# Patient Record
Sex: MALE | Race: BLACK OR AFRICAN AMERICAN | NOT HISPANIC OR LATINO | Employment: FULL TIME | URBAN - METROPOLITAN AREA
[De-identification: names, ages, dates, MRNs, and addresses within clinical notes are randomized per-mention and may not be internally consistent; named-entity substitution may affect disease eponyms.]

---

## 2021-01-31 ENCOUNTER — OFFICE VISIT (OUTPATIENT)
Dept: URGENT CARE | Facility: CLINIC | Age: 41
End: 2021-01-31

## 2021-01-31 VITALS
RESPIRATION RATE: 16 BRPM | HEIGHT: 74 IN | SYSTOLIC BLOOD PRESSURE: 153 MMHG | HEART RATE: 99 BPM | BODY MASS INDEX: 24.51 KG/M2 | WEIGHT: 191 LBS | OXYGEN SATURATION: 96 % | TEMPERATURE: 98 F | DIASTOLIC BLOOD PRESSURE: 105 MMHG

## 2021-01-31 DIAGNOSIS — U07.1 COVID-19 VIRUS INFECTION: Primary | ICD-10-CM

## 2021-01-31 DIAGNOSIS — U07.1 COVID-19 VIRUS DETECTED: ICD-10-CM

## 2021-01-31 DIAGNOSIS — Z11.59 ENCOUNTER FOR SCREENING FOR OTHER VIRAL DISEASES: ICD-10-CM

## 2021-01-31 LAB
CTP QC/QA: YES
SARS-COV-2 RDRP RESP QL NAA+PROBE: POSITIVE

## 2021-01-31 PROCEDURE — 99203 OFFICE O/P NEW LOW 30 MIN: CPT | Mod: S$GLB,,, | Performed by: NURSE PRACTITIONER

## 2021-01-31 PROCEDURE — 99203 PR OFFICE/OUTPT VISIT, NEW, LEVL III, 30-44 MIN: ICD-10-PCS | Mod: S$GLB,,, | Performed by: NURSE PRACTITIONER

## 2021-01-31 PROCEDURE — U0002: ICD-10-PCS | Mod: QW,S$GLB,, | Performed by: NURSE PRACTITIONER

## 2021-01-31 PROCEDURE — U0002 COVID-19 LAB TEST NON-CDC: HCPCS | Mod: QW,S$GLB,, | Performed by: NURSE PRACTITIONER

## 2021-02-16 ENCOUNTER — OFFICE VISIT (OUTPATIENT)
Dept: URGENT CARE | Facility: CLINIC | Age: 41
End: 2021-02-16

## 2021-04-12 ENCOUNTER — OFFICE VISIT (OUTPATIENT)
Dept: URGENT CARE | Facility: CLINIC | Age: 41
End: 2021-04-12

## 2021-04-12 VITALS
RESPIRATION RATE: 17 BRPM | DIASTOLIC BLOOD PRESSURE: 96 MMHG | WEIGHT: 193 LBS | TEMPERATURE: 97 F | OXYGEN SATURATION: 97 % | HEART RATE: 63 BPM | HEIGHT: 74 IN | BODY MASS INDEX: 24.77 KG/M2 | SYSTOLIC BLOOD PRESSURE: 154 MMHG

## 2021-04-12 DIAGNOSIS — L02.31 LEFT BUTTOCK ABSCESS: Primary | ICD-10-CM

## 2021-04-12 PROCEDURE — 99204 PR OFFICE/OUTPT VISIT, NEW, LEVL IV, 45-59 MIN: ICD-10-PCS | Mod: TIER,25,S$GLB, | Performed by: NURSE PRACTITIONER

## 2021-04-12 PROCEDURE — 10060 INCISION & DRAINAGE: ICD-10-PCS | Mod: TIER,S$GLB,, | Performed by: NURSE PRACTITIONER

## 2021-04-12 PROCEDURE — 99204 OFFICE O/P NEW MOD 45 MIN: CPT | Mod: TIER,25,S$GLB, | Performed by: NURSE PRACTITIONER

## 2021-04-12 PROCEDURE — 10060 I&D ABSCESS SIMPLE/SINGLE: CPT | Mod: TIER,S$GLB,, | Performed by: NURSE PRACTITIONER

## 2021-04-12 RX ORDER — HYDROCODONE BITARTRATE AND ACETAMINOPHEN 5; 325 MG/1; MG/1
1 TABLET ORAL EVERY 6 HOURS PRN
Qty: 10 TABLET | Refills: 0 | Status: SHIPPED | OUTPATIENT
Start: 2021-04-12

## 2021-04-12 RX ORDER — MUPIROCIN 20 MG/G
OINTMENT TOPICAL
Qty: 22 G | Refills: 1 | Status: SHIPPED | OUTPATIENT
Start: 2021-04-12

## 2021-04-12 RX ORDER — SULFAMETHOXAZOLE AND TRIMETHOPRIM 800; 160 MG/1; MG/1
1 TABLET ORAL 2 TIMES DAILY
Qty: 20 TABLET | Refills: 0 | Status: SHIPPED | OUTPATIENT
Start: 2021-04-12 | End: 2021-04-22

## 2021-07-17 ENCOUNTER — OFFICE VISIT (OUTPATIENT)
Dept: URGENT CARE | Facility: CLINIC | Age: 41
End: 2021-07-17

## 2021-07-17 VITALS
OXYGEN SATURATION: 97 % | RESPIRATION RATE: 18 BRPM | TEMPERATURE: 98 F | DIASTOLIC BLOOD PRESSURE: 97 MMHG | BODY MASS INDEX: 24.77 KG/M2 | HEIGHT: 74 IN | HEART RATE: 85 BPM | WEIGHT: 193 LBS | SYSTOLIC BLOOD PRESSURE: 146 MMHG

## 2021-07-17 DIAGNOSIS — J06.9 UPPER RESPIRATORY TRACT INFECTION, UNSPECIFIED TYPE: ICD-10-CM

## 2021-07-17 DIAGNOSIS — R50.9 FEVER, UNSPECIFIED FEVER CAUSE: Primary | ICD-10-CM

## 2021-07-17 LAB
CTP QC/QA: YES
SARS-COV-2 RDRP RESP QL NAA+PROBE: NEGATIVE

## 2021-07-17 PROCEDURE — U0002 COVID-19 LAB TEST NON-CDC: HCPCS | Mod: QW,TIER,S$GLB, | Performed by: EMERGENCY MEDICINE

## 2021-07-17 PROCEDURE — U0002: ICD-10-PCS | Mod: QW,TIER,S$GLB, | Performed by: EMERGENCY MEDICINE

## 2021-07-17 PROCEDURE — 99203 PR OFFICE/OUTPT VISIT, NEW, LEVL III, 30-44 MIN: ICD-10-PCS | Mod: TIER,S$GLB,, | Performed by: EMERGENCY MEDICINE

## 2021-07-17 PROCEDURE — 99203 OFFICE O/P NEW LOW 30 MIN: CPT | Mod: TIER,S$GLB,, | Performed by: EMERGENCY MEDICINE

## 2021-11-10 ENCOUNTER — OFFICE VISIT (OUTPATIENT)
Dept: URGENT CARE | Facility: CLINIC | Age: 41
End: 2021-11-10

## 2021-11-10 VITALS
RESPIRATION RATE: 16 BRPM | HEART RATE: 74 BPM | HEIGHT: 74 IN | DIASTOLIC BLOOD PRESSURE: 106 MMHG | OXYGEN SATURATION: 98 % | BODY MASS INDEX: 25.41 KG/M2 | TEMPERATURE: 99 F | WEIGHT: 198 LBS | SYSTOLIC BLOOD PRESSURE: 159 MMHG

## 2021-11-10 DIAGNOSIS — Z11.59 ENCOUNTER FOR SCREENING FOR OTHER VIRAL DISEASES: Primary | ICD-10-CM

## 2021-11-10 LAB
CTP QC/QA: YES
SARS-COV-2 RDRP RESP QL NAA+PROBE: NEGATIVE

## 2021-11-10 PROCEDURE — U0002: ICD-10-PCS | Mod: QW,TIER,S$GLB,

## 2021-11-10 PROCEDURE — U0002 COVID-19 LAB TEST NON-CDC: HCPCS | Mod: QW,TIER,S$GLB,

## 2021-11-10 PROCEDURE — 99203 OFFICE O/P NEW LOW 30 MIN: CPT | Mod: TIER,S$GLB,,

## 2021-11-10 PROCEDURE — 99203 PR OFFICE/OUTPT VISIT, NEW, LEVL III, 30-44 MIN: ICD-10-PCS | Mod: TIER,S$GLB,,

## 2022-04-02 ENCOUNTER — OFFICE VISIT (OUTPATIENT)
Dept: URGENT CARE | Facility: CLINIC | Age: 42
End: 2022-04-02

## 2022-04-02 VITALS
DIASTOLIC BLOOD PRESSURE: 121 MMHG | WEIGHT: 198 LBS | HEIGHT: 74 IN | TEMPERATURE: 98 F | BODY MASS INDEX: 25.41 KG/M2 | RESPIRATION RATE: 18 BRPM | OXYGEN SATURATION: 98 % | SYSTOLIC BLOOD PRESSURE: 156 MMHG | HEART RATE: 68 BPM

## 2022-04-02 DIAGNOSIS — L03.317 CELLULITIS AND ABSCESS OF BUTTOCK: Primary | ICD-10-CM

## 2022-04-02 DIAGNOSIS — L02.31 CELLULITIS AND ABSCESS OF BUTTOCK: Primary | ICD-10-CM

## 2022-04-02 PROCEDURE — 10060 INCISION & DRAINAGE: ICD-10-PCS | Mod: TIER,S$GLB,, | Performed by: NURSE PRACTITIONER

## 2022-04-02 PROCEDURE — 99204 OFFICE O/P NEW MOD 45 MIN: CPT | Mod: TIER,25,S$GLB, | Performed by: NURSE PRACTITIONER

## 2022-04-02 PROCEDURE — 99204 PR OFFICE/OUTPT VISIT, NEW, LEVL IV, 45-59 MIN: ICD-10-PCS | Mod: TIER,25,S$GLB, | Performed by: NURSE PRACTITIONER

## 2022-04-02 PROCEDURE — 10060 I&D ABSCESS SIMPLE/SINGLE: CPT | Mod: TIER,S$GLB,, | Performed by: NURSE PRACTITIONER

## 2022-04-02 RX ORDER — IBUPROFEN 600 MG/1
600 TABLET ORAL 3 TIMES DAILY PRN
Qty: 30 TABLET | Refills: 0 | Status: SHIPPED | OUTPATIENT
Start: 2022-04-02

## 2022-04-02 RX ORDER — SULFAMETHOXAZOLE AND TRIMETHOPRIM 800; 160 MG/1; MG/1
1 TABLET ORAL 2 TIMES DAILY
Qty: 14 TABLET | Refills: 0 | Status: SHIPPED | OUTPATIENT
Start: 2022-04-02 | End: 2022-04-09

## 2022-04-02 RX ORDER — MUPIROCIN 20 MG/G
OINTMENT TOPICAL 3 TIMES DAILY
Qty: 1 EACH | Refills: 0 | Status: SHIPPED | OUTPATIENT
Start: 2022-04-02 | End: 2022-04-07

## 2022-04-02 NOTE — PROCEDURES
"Incision & Drainage    Date/Time: 4/2/2022 12:15 PM  Performed by: Yobany Wolf NP  Authorized by: Yobany Wolf NP     Time out: Immediately prior to procedure a "time out" was called to verify the correct patient, procedure, equipment, support staff and site/side marked as required.    Consent Done?:  Yes (Verbal)    Type:  Abscess  Body area:  Anogenital (left buttocks)  Local anesthetic: lidocaine 1% without epinephrine  Anesthetic total (ml):  1  Scalpel size:  11  Incision type:  Single straight  Incision depth: dermal    Complexity:  Simple  Drainage:  Pus, purulent and bloody  Drainage amount:  Moderate  Wound treatment:  Incision, drainage and wound left open  Packing material:  None  Patient tolerance:  Patient tolerated the procedure well with no immediate complications      "

## 2022-04-02 NOTE — PROGRESS NOTES
"Subjective:       Patient ID: Eben Lagunas is a 41 y.o. male.    Vitals:  height is 6' 2" (1.88 m) and weight is 89.8 kg (198 lb). His temperature is 98.3 °F (36.8 °C). His blood pressure is 156/121 (abnormal) and his pulse is 68. His respiration is 18 and oxygen saturation is 98%.     Chief Complaint: Insect Bite    41 y.o. male presents today with what he suspects to be a spider bite. Pt states he isn't sure if it is an abscess or insect bite.     Provider note begins below:  41-year-old male presents today with complaints of a possible abscess to his left buttocks area for the past 3-4 days.  Patient complains of pain to the area.  Denies fever, chills, or drainage from the area.  Patient reports a history of abscesses on his buttocks in the past.  Patient tried no remedies.  Denies actually seeing a spider bite him.    Insect Bite  This is a new problem. The current episode started in the past 7 days (Monday ). The problem occurs constantly. Associated symptoms include a rash and a visual change. Pertinent negatives include no abdominal pain, anorexia, arthralgias, change in bowel habit, chest pain, chills, congestion, coughing, diaphoresis, fatigue, fever, headaches, joint swelling, myalgias, nausea, neck pain, numbness, sore throat, swollen glands, urinary symptoms, vertigo, vomiting or weakness. Associated symptoms comments: stinging. The symptoms are aggravated by standing and twisting. He has tried NSAIDs for the symptoms. The treatment provided mild relief.       Constitution: Negative. Negative for chills, sweating, fatigue and fever.   HENT: Negative for ear pain, ear discharge, facial swelling, congestion, sinus pressure and sore throat.    Neck: Negative for neck pain, neck stiffness and painful lymph nodes.   Cardiovascular: Negative.  Negative for chest pain and sob on exertion.   Eyes: Negative.  Negative for eye itching, eye pain and eye redness.   Respiratory: Negative.  Negative for " chest tightness, cough, shortness of breath, wheezing and asthma.    Gastrointestinal: Negative.  Negative for abdominal pain, nausea and vomiting.   Endocrine: negative. excessive thirst.   Genitourinary: Negative.  Negative for dysuria, frequency, urgency and flank pain.   Musculoskeletal: Negative.  Negative for pain, trauma, joint pain, joint swelling and muscle ache.   Skin: Positive for rash. Negative for wound, lesion, erythema and hives.   Allergic/Immunologic: Negative.  Negative for eczema, asthma, hives, itching and sneezing.   Neurological: Negative.  Negative for dizziness, history of vertigo, passing out, headaches, disorientation, altered mental status and numbness.   Hematologic/Lymphatic: Negative.  Negative for swollen lymph nodes.   Psychiatric/Behavioral: Negative.  Negative for altered mental status, disorientation and confusion.       Objective:      Physical Exam   Constitutional: He is oriented to person, place, and time. He appears well-developed.   HENT:   Head: Normocephalic and atraumatic. Head is without abrasion, without contusion and without laceration.   Ears:   Right Ear: External ear normal.   Left Ear: External ear normal.   Nose: Nose normal.   Mouth/Throat: Oropharynx is clear and moist and mucous membranes are normal.   Eyes: Conjunctivae, EOM and lids are normal. Pupils are equal, round, and reactive to light.   Neck: Trachea normal and phonation normal. Neck supple.   Cardiovascular: Normal rate, regular rhythm and normal heart sounds.   Pulmonary/Chest: Effort normal and breath sounds normal. No stridor. No respiratory distress.   Musculoskeletal: Normal range of motion.         General: Normal range of motion.   Neurological: He is alert and oriented to person, place, and time.   Skin: Skin is warm, dry, intact and no rash. Capillary refill takes less than 2 seconds. No abrasion, No burn, No bruising, No erythema and No ecchymosis         Comments: 1 cm abscess located to  left outer buttocks area with induration and fluctuation with pus pocket.   Psychiatric: His speech is normal and behavior is normal. Judgment and thought content normal.   Nursing note and vitals reviewed.            Assessment:       1. Cellulitis and abscess of buttock          Plan:       FOLLOWUP  Follow up if symptoms worsen or fail to improve, for PLEASE CONTACT PCP OR CONTACT THE EMERGENCY ROOM..     PATIENT INSTRUCTIONS  Patient Instructions   INSTRUCTIONS:  - Rest.  - Drink plenty of fluids.  - Take Tylenol and/or Ibuprofen as directed as needed for fever/pain.  Do not take more than the recommended dose.  - follow up with your PCP within the next 1-2 weeks as needed.  - You must understand that you have received an Urgent Care treatment only and that you may be released before all of your medical problems are known or treated.   - You, the patient, will arrange for follow up care as instructed.   - If your condition worsens or fails to improve we recommend that you receive another evaluation at the ER immediately or contact your PCP to discuss your concerns.   - You can call [phone] or [phone] to help schedule an appointment with the appropriate provider.              THANK YOU FOR ALLOWING ME TO PARTICIPATE IN YOUR HEALTHCARE,     Yobany Wolf, NP   Cellulitis and abscess of buttock  -     sulfamethoxazole-trimethoprim 800-160mg (BACTRIM DS) 800-160 mg Tab; Take 1 tablet by mouth 2 (two) times daily. for 7 days  Dispense: 14 tablet; Refill: 0  -     mupirocin (BACTROBAN) 2 % ointment; Apply topically 3 (three) times daily. for 5 days  Dispense: 1 each; Refill: 0  -     ibuprofen (ADVIL,MOTRIN) 600 MG tablet; Take 1 tablet (600 mg total) by mouth 3 (three) times daily as needed for Pain.  Dispense: 30 tablet; Refill: 0  -     Incision & Drainage

## 2022-04-02 NOTE — PATIENT INSTRUCTIONS
INSTRUCTIONS:  - Rest.  - Drink plenty of fluids.  - Take Tylenol and/or Ibuprofen as directed as needed for fever/pain.  Do not take more than the recommended dose.  - follow up with your PCP within the next 1-2 weeks as needed.  - You must understand that you have received an Urgent Care treatment only and that you may be released before all of your medical problems are known or treated.   - You, the patient, will arrange for follow up care as instructed.   - If your condition worsens or fails to improve we recommend that you receive another evaluation at the ER immediately or contact your PCP to discuss your concerns.   - You can call [phone] or [phone] to help schedule an appointment with the appropriate provider.

## 2023-07-30 ENCOUNTER — HOSPITAL ENCOUNTER (EMERGENCY)
Facility: HOSPITAL | Age: 43
Discharge: HOME | End: 2023-07-30
Attending: EMERGENCY MEDICINE

## 2023-07-30 VITALS
OXYGEN SATURATION: 98 % | DIASTOLIC BLOOD PRESSURE: 111 MMHG | RESPIRATION RATE: 18 BRPM | SYSTOLIC BLOOD PRESSURE: 164 MMHG | TEMPERATURE: 99 F | HEART RATE: 69 BPM

## 2023-07-30 DIAGNOSIS — M54.6 CHRONIC BILATERAL THORACIC BACK PAIN: Primary | ICD-10-CM

## 2023-07-30 DIAGNOSIS — G89.29 CHRONIC BILATERAL THORACIC BACK PAIN: Primary | ICD-10-CM

## 2023-07-30 PROCEDURE — 96372 THER/PROPH/DIAG INJ SC/IM: CPT | Performed by: PHYSICIAN ASSISTANT

## 2023-07-30 PROCEDURE — 99284 EMERGENCY DEPT VISIT MOD MDM: CPT

## 2023-07-30 PROCEDURE — 63600175 PHARM REV CODE 636 W HCPCS: Performed by: PHYSICIAN ASSISTANT

## 2023-07-30 RX ORDER — DEXAMETHASONE SODIUM PHOSPHATE 4 MG/ML
8 INJECTION, SOLUTION INTRA-ARTICULAR; INTRALESIONAL; INTRAMUSCULAR; INTRAVENOUS; SOFT TISSUE
Status: COMPLETED | OUTPATIENT
Start: 2023-07-30 | End: 2023-07-30

## 2023-07-30 RX ORDER — METHOCARBAMOL 750 MG/1
1500 TABLET, FILM COATED ORAL EVERY 8 HOURS PRN
Qty: 12 TABLET | Refills: 0 | Status: SHIPPED | OUTPATIENT
Start: 2023-07-30 | End: 2023-07-31

## 2023-07-30 RX ORDER — MELOXICAM 7.5 MG/1
7.5 TABLET ORAL DAILY
Qty: 30 TABLET | Refills: 0 | Status: SHIPPED | OUTPATIENT
Start: 2023-07-30 | End: 2023-07-31

## 2023-07-30 RX ADMIN — DEXAMETHASONE SODIUM PHOSPHATE 8 MG: 4 INJECTION INTRA-ARTICULAR; INTRALESIONAL; INTRAMUSCULAR; INTRAVENOUS; SOFT TISSUE at 08:07

## 2023-07-30 NOTE — Clinical Note
"Eben"Adria Lagunas was seen and treated in our emergency department on 7/30/2023.  He may return to work on 07/31/2023.       If you have any questions or concerns, please don't hesitate to call.      Abida Kendall PA-C"

## 2023-07-31 ENCOUNTER — OFFICE VISIT (OUTPATIENT)
Dept: URGENT CARE | Facility: CLINIC | Age: 43
End: 2023-07-31

## 2023-07-31 VITALS
TEMPERATURE: 98 F | WEIGHT: 190 LBS | RESPIRATION RATE: 16 BRPM | HEIGHT: 74 IN | SYSTOLIC BLOOD PRESSURE: 140 MMHG | HEART RATE: 87 BPM | DIASTOLIC BLOOD PRESSURE: 100 MMHG | OXYGEN SATURATION: 99 % | BODY MASS INDEX: 24.38 KG/M2

## 2023-07-31 DIAGNOSIS — M54.9 BILATERAL BACK PAIN, UNSPECIFIED BACK LOCATION, UNSPECIFIED CHRONICITY: ICD-10-CM

## 2023-07-31 DIAGNOSIS — S39.012A BACK STRAIN, INITIAL ENCOUNTER: ICD-10-CM

## 2023-07-31 DIAGNOSIS — I10 PRIMARY HYPERTENSION: Primary | ICD-10-CM

## 2023-07-31 PROCEDURE — 72070 XR THORACIC SPINE AP LATERAL: ICD-10-PCS | Mod: TIER,S$GLB,, | Performed by: RADIOLOGY

## 2023-07-31 PROCEDURE — 72100 X-RAY EXAM L-S SPINE 2/3 VWS: CPT | Mod: TIER,S$GLB,, | Performed by: RADIOLOGY

## 2023-07-31 PROCEDURE — 99204 OFFICE O/P NEW MOD 45 MIN: CPT | Mod: TIER,25,S$GLB, | Performed by: FAMILY MEDICINE

## 2023-07-31 PROCEDURE — 96372 PR INJECTION,THERAP/PROPH/DIAG2ST, IM OR SUBCUT: ICD-10-PCS | Mod: TIER,S$GLB,, | Performed by: FAMILY MEDICINE

## 2023-07-31 PROCEDURE — 72100 XR LUMBAR SPINE AP AND LATERAL: ICD-10-PCS | Mod: TIER,S$GLB,, | Performed by: RADIOLOGY

## 2023-07-31 PROCEDURE — 72070 X-RAY EXAM THORAC SPINE 2VWS: CPT | Mod: TIER,S$GLB,, | Performed by: RADIOLOGY

## 2023-07-31 PROCEDURE — 99204 PR OFFICE/OUTPT VISIT, NEW, LEVL IV, 45-59 MIN: ICD-10-PCS | Mod: TIER,25,S$GLB, | Performed by: FAMILY MEDICINE

## 2023-07-31 PROCEDURE — 96372 THER/PROPH/DIAG INJ SC/IM: CPT | Mod: TIER,S$GLB,, | Performed by: FAMILY MEDICINE

## 2023-07-31 RX ORDER — AMLODIPINE BESYLATE 2.5 MG/1
2.5 TABLET ORAL DAILY
Qty: 60 TABLET | Refills: 0 | Status: SHIPPED | OUTPATIENT
Start: 2023-07-31 | End: 2023-09-29

## 2023-07-31 RX ORDER — CLONIDINE HYDROCHLORIDE 0.1 MG/1
0.2 TABLET ORAL
Status: COMPLETED | OUTPATIENT
Start: 2023-07-31 | End: 2023-07-31

## 2023-07-31 RX ORDER — CYCLOBENZAPRINE HCL 5 MG
TABLET ORAL
Qty: 20 TABLET | Refills: 0 | Status: SHIPPED | OUTPATIENT
Start: 2023-07-31

## 2023-07-31 RX ORDER — KETOROLAC TROMETHAMINE 30 MG/ML
30 INJECTION, SOLUTION INTRAMUSCULAR; INTRAVENOUS
Status: COMPLETED | OUTPATIENT
Start: 2023-07-31 | End: 2023-07-31

## 2023-07-31 RX ORDER — NAPROXEN 500 MG/1
500 TABLET ORAL 2 TIMES DAILY WITH MEALS
Qty: 20 TABLET | Refills: 0 | Status: SHIPPED | OUTPATIENT
Start: 2023-07-31 | End: 2023-08-10

## 2023-07-31 RX ADMIN — KETOROLAC TROMETHAMINE 30 MG: 30 INJECTION, SOLUTION INTRAMUSCULAR; INTRAVENOUS at 04:07

## 2023-07-31 RX ADMIN — CLONIDINE HYDROCHLORIDE 0.2 MG: 0.1 TABLET ORAL at 04:07

## 2023-07-31 NOTE — ED PROVIDER NOTES
Encounter Date: 7/30/2023       History     Chief Complaint   Patient presents with    Back Pain     Patient reports that he has chronic back pain but that it has been increasing. Patient reports that it is in upper right back, stabbing and has been worsening for months.      8:21 PM  Patient is a 43-year-old male who [place] [facility] [place] with his fiancee for emergent evaluation of back pain for several months.    He denies any recent injury, trauma, or heavy lifting.  He does not work outside.  He states his pain increased today which prompted him to seek emergent attention.  He endorses most pain between his shoulder blades and bilateral thoracic back.  It has not radiated in the past several months. Stabbing in nature. Currently 10/10.  No abdominal pain or  pain.  It is worse with movement and laying back.  His pain improves with heated seats in his car.  States some days his pain is mild.  He has not had diarrhea, dysuria, hematuria, bowel or bladder incontinence, saddle anesthesias, lower extremity paresthesias.  He denies IV drug use.    He has tried ibuprofen, naproxen, and his partner's robaxin without relief.         Review of patient's allergies indicates:  No Known Allergies  No past medical history on file.  No past surgical history on file.  Family History   Problem Relation Age of Onset    No Known Problems Mother     Glaucoma Father      Social History     Tobacco Use    Smoking status: Some Days     Current packs/day: 1.00     Average packs/day: 1 pack/day for 9.0 years (9.0 ttl pk-yrs)     Types: Cigarettes     Start date: 7/16/2014     Passive exposure: Current    Smokeless tobacco: Former   Substance Use Topics    Alcohol use: Yes     Comment: socially     Review of Systems   Constitutional:  Negative for activity change, appetite change, chills, diaphoresis and fever.   HENT:  Negative for sore throat.    Respiratory:  Negative for cough and shortness of breath.    Cardiovascular:   Negative for chest pain.   Gastrointestinal:  Negative for abdominal pain, blood in stool, constipation and diarrhea.   Genitourinary:  Negative for dysuria, hematuria, penile pain, scrotal swelling and testicular pain.   Musculoskeletal:  Positive for back pain.   Skin:  Negative for rash.   Neurological:  Negative for weakness.   Hematological:  Does not bruise/bleed easily.       Physical Exam     Initial Vitals [07/30/23 1923]   BP Pulse Resp Temp SpO2   (!) 164/111 69 18 98.5 °F (36.9 °C) 98 %      MAP       --         Physical Exam    Vitals reviewed.  Constitutional: He appears well-developed and well-nourished. He is not diaphoretic. He is cooperative.  Non-toxic appearance. He does not have a sickly appearance. He does not appear ill. No distress.   HENT:   Head: Normocephalic and atraumatic.   Nose: Nose normal.   Mouth/Throat: No trismus in the jaw.   Eyes: Conjunctivae and EOM are normal.   Neck:   Normal range of motion.  Pulmonary/Chest: No accessory muscle usage. No tachypnea. No respiratory distress.   Abdominal: He exhibits no distension.   Musculoskeletal:         General: Normal range of motion.      Cervical back: Normal range of motion. No rigidity, tenderness or bony tenderness. Normal range of motion.      Thoracic back: Tenderness present. No swelling, edema, deformity, lacerations, spasms or bony tenderness. Normal range of motion.      Lumbar back: No bony tenderness. Normal range of motion. Negative right straight leg raise test and negative left straight leg raise test.      Comments: Felt his back pain with straight leg raises, but it did not exacerbate or case pain down the leg.      Neurological: He is alert. He has normal strength.   Reflex Scores:       Patellar reflexes are 2+ on the right side and 2+ on the left side.       Achilles reflexes are 2+ on the right side and 2+ on the left side.  FROM and 5/5 strength of kajal LE. Sensations grossly intact.   No difficulty bearing weight  or ambulating.    Skin: Skin is dry. No abrasion, no bruising, no ecchymosis, no laceration and no abscess noted. No erythema. No pallor.         ED Course   Procedures  Labs Reviewed - No data to display       Imaging Results    None          Medications   dexAMETHasone injection 8 mg (8 mg Intramuscular Given 7/30/23 2044)     Medical Decision Making:   Initial Assessment:   Patient is a 43-year-old male who presents to [facility] ED via POV with his fiancee for emergent evaluation of back pain for several months.  Differential Diagnosis:   Includes but is not limited to DDD, DJD, OA, other arthritis, sprain, strain. Doubt UTI since no urinary symptoms and also pain formonths. Dose not sound colicky. No abd pain. Abd NTND. Doubt acute surgical abd. No red flag symptoms to suggest spinal cord compression or ELLIOTT.   ED Management:  Patient has back pain without complications. Reassuring exam. He does not need any emergent labs or imaging at this time for his chronic back pain. We will treat conservatively. Steroid IM injection here. Will switch NSAIDs to mobic daily. D/c other NSAIDs. Eat prior. Acetaminophen in addition. Ice and heat. We discussed proper ergonomics when sitting and lifting techniques. He will try morning back stretches and core strengthening workouts. Follow up with [facility]. All questions answered. Patient and fiance comfortable with plan and patient is stable for discharge.              ED Course as of 07/31/23 2238   Sun Jul 30, 2023 2007 BP(!): 164/111 [CL]   2007 Temp: 98.5 °F (36.9 °C) [CL]   2007 Pulse: 69 [CL]   2007 Resp: 18 [CL]   2007 SpO2: 98 % [CL]      ED Course User Index  [CL] Abida Kendall PA-C                 Clinical Impression:   Final diagnoses:  [M54.6, G89.29] Chronic bilateral thoracic back pain (Primary)        ED Disposition Condition    Discharge Stable          ED Prescriptions       Medication Sig Dispense Start Date End Date Auth. Provider    meloxicam (MOBIC) 7.5 MG  tablet  Take 1 tablet (7.5 mg total) by mouth once daily. 30 tablet 7/30/2023 7/31/2023 Abida Kendall PA-C    methocarbamoL (ROBAXIN) 750 MG Tab  Take 2 tablets (1,500 mg total) by mouth every 8 (eight) hours as needed. 12 tablet 7/30/2023 7/31/2023 Abida Kendall PA-C          Follow-up Information       Follow up With Specialties Details Why Contact Info Additional Information    [facility] Schedule an appointment as soon as possible for a visit   [address], [address]  [facility] [address]  [phone] [facility] - [facility], 4th Floor Please park in [place] and use [place] elevators    [facility] Emergency [facility] Emergency Medicine  If symptoms worsen [address]  [facility] [address]  [phone]              Abida Kendall PA-C  07/31/23 2702

## 2023-07-31 NOTE — DISCHARGE INSTRUCTIONS
Take mobic every 24 hours as needed with food for anti-inflammatory relief. Do not take other NSAIDs such as ibuprofen or naproxen.  You can take acetaminophen/tylenol 650 mg every 6 hours or 1000 mg every 8 hours for added relief.  Take robaxin every 8 hours as needed for muscle spasms.   Apply ice or heat to the area for 10-20 minutes every 4 hours.   Do back stretches every morning for 5-10 minutes.   Incorporate core strengthening workouts in your exercises.   Follow up with [facility] and PCP  Return to the ER for new or worsening symptoms.

## 2023-07-31 NOTE — PROGRESS NOTES
"Subjective:      Patient ID: Eben Lagunas is a 43 y.o. male.    Vitals:  height is 6' 2" (1.88 m) and weight is 86.2 kg (190 lb). His oral temperature is 98 °F (36.7 °C). His blood pressure is 140/100 (abnormal) and his pulse is 87. His respiration is 16 and oxygen saturation is 99%.     Chief Complaint: Back Pain    Patient is a 43 y.o. male whom reports to the clinic with the complaint of back pain that presented about 2 month ago, he reports with going to the ER on yesterday and received a steroid injection and informed that back pain was musculoskeletal pain however, no xray was preformed to determine the findings of it being musculoskeletal.  Pain is located to mid and lower back, reports no radiation, intensity varies, at present 8/10, increased with back movements. Denies any radiating leg pain, paresthesias, weakness, saddle anesthesia or Bowel/Bladder dysfunction. Denies fever, dysuria, abdominal pain, N/V.  Blood pressure is noted to be elevated.  Patient reports history of hypertension and has stopped taking blood pressure medicines because of lack of insurance.  Denies headache, chest pain, SOB, weakness.       Back Pain  This is a new problem. The current episode started more than 1 month ago. The problem occurs constantly. The problem has been gradually worsening since onset. The pain is present in the thoracic spine. The quality of the pain is described as stabbing. Radiates to: between the shoulder blades. The pain is at a severity of 10/10. The pain is severe. The pain is The same all the time. The symptoms are aggravated by position and standing. Stiffness is present All day. (Vomiting.) He has tried heat, bed rest, home exercises and muscle relaxant for the symptoms. The treatment provided no relief.       Musculoskeletal:  Positive for back pain.      Objective:     Physical Exam   Constitutional: He is oriented to person, place, and time. He appears well-developed. He is cooperative. No " distress.   HENT:   Head: Normocephalic and atraumatic.   Ears:   Right Ear: Hearing, tympanic membrane, external ear and ear canal normal. impacted cerumen  Left Ear: Hearing, tympanic membrane, external ear and ear canal normal. impacted cerumen  Nose: Nose normal. No mucosal edema, rhinorrhea, nasal deformity or congestion. No epistaxis. Right sinus exhibits no maxillary sinus tenderness and no frontal sinus tenderness. Left sinus exhibits no maxillary sinus tenderness and no frontal sinus tenderness.   Mouth/Throat: Uvula is midline, oropharynx is clear and moist and mucous membranes are normal. No trismus in the jaw. Normal dentition. No uvula swelling. No oropharyngeal exudate or posterior oropharyngeal erythema.   Eyes: Conjunctivae and lids are normal.   Neck: Trachea normal and phonation normal. Neck supple.   Cardiovascular: Normal rate, regular rhythm, normal heart sounds and normal pulses.   No murmur heard.  Pulmonary/Chest: Effort normal and breath sounds normal. No stridor. No respiratory distress. He has no wheezes. He has no rhonchi. He has no rales.   Abdominal: Normal appearance and bowel sounds are normal. He exhibits no distension and no mass. Soft. There is no abdominal tenderness. There is no left CVA tenderness and no right CVA tenderness.   Musculoskeletal:      Comments:   Back exam:     +ve TTP to Thoracic and lumbar bilateral paraspinal muscles  No redness, swelling, bruise   ROM:   Lateral flexion mildly limited secondary to muscle spasm,   Forward flexion mildly limited secondary to muscle spasm.  No sensory/motor deficit  SLR: WNL      Neurological: He is alert and oriented to person, place, and time. He has normal strength and normal reflexes. No sensory deficit. He exhibits normal muscle tone.   Skin: Skin is warm, dry, intact and not diaphoretic.   Psychiatric: His speech is normal and behavior is normal. Judgment and thought content normal.   Nursing note and vitals  reviewed.      Assessment:     1. Primary hypertension    2. Bilateral back pain, unspecified back location, unspecified chronicity    3. Back strain, initial encounter        Plan:   X-rays positive for mild disc height loss at L5/S1 and very minimal upper thoracic dextrocurvature.  No other acute findings.  Discussed results/diagnosis/plan with patient in clinic.  Back and hypertension precautions and advised to patient.  Advised to f/u with PCP within 2-5 days. ER precautions given if symptoms get any worse. All questions answered. Patient verbally understood and agreed with treatment plan.     Primary hypertension  -     Ambulatory referral/consult to Family Practice    Bilateral back pain, unspecified back location, unspecified chronicity  -     X-Ray Lumbar Spine Ap And Lateral; Future; Expected date: 07/31/2023  -     X-Ray Thoracic Spine AP Lateral; Future; Expected date: 07/31/2023  -     Ambulatory referral/consult to Family Practice    Back strain, initial encounter    Other orders  -     cloNIDine tablet 0.2 mg  -     ketorolac injection 30 mg  -     amLODIPine (NORVASC) 2.5 MG tablet; Take 1 tablet (2.5 mg total) by mouth once daily.  Dispense: 60 tablet; Refill: 0  -     naproxen (NAPROSYN) 500 MG tablet; Take 1 tablet (500 mg total) by mouth 2 (two) times daily with meals. for 10 days  Dispense: 20 tablet; Refill: 0  -     cyclobenzaprine (FLEXERIL) 5 MG tablet; One tablet t.i.d. p.r.n. for back pain and muscle spasm  Dispense: 20 tablet; Refill: 0

## 2024-10-07 ENCOUNTER — OFFICE VISIT (OUTPATIENT)
Dept: URGENT CARE | Facility: CLINIC | Age: 44
End: 2024-10-07
Payer: COMMERCIAL

## 2024-10-07 VITALS
HEART RATE: 69 BPM | BODY MASS INDEX: 23.74 KG/M2 | TEMPERATURE: 99 F | RESPIRATION RATE: 16 BRPM | HEIGHT: 74 IN | SYSTOLIC BLOOD PRESSURE: 144 MMHG | OXYGEN SATURATION: 97 % | DIASTOLIC BLOOD PRESSURE: 101 MMHG | WEIGHT: 185 LBS

## 2024-10-07 DIAGNOSIS — I10 PRIMARY HYPERTENSION: ICD-10-CM

## 2024-10-07 DIAGNOSIS — K21.9 GASTROESOPHAGEAL REFLUX DISEASE, UNSPECIFIED WHETHER ESOPHAGITIS PRESENT: ICD-10-CM

## 2024-10-07 DIAGNOSIS — Z76.89 ENCOUNTER TO ESTABLISH CARE: ICD-10-CM

## 2024-10-07 DIAGNOSIS — R11.10 VOMITING, UNSPECIFIED VOMITING TYPE, UNSPECIFIED WHETHER NAUSEA PRESENT: Primary | ICD-10-CM

## 2024-10-07 LAB
BILIRUBIN, UA POC OHS: ABNORMAL
BLOOD, UA POC OHS: ABNORMAL
CLARITY, UA POC OHS: ABNORMAL
COLOR, UA POC OHS: ABNORMAL
CTP QC/QA: YES
CTP QC/QA: YES
GLUCOSE, UA POC OHS: NEGATIVE
KETONES, UA POC OHS: NEGATIVE
LEUKOCYTES, UA POC OHS: NEGATIVE
NITRITE, UA POC OHS: NEGATIVE
PH, UA POC OHS: 6.5
POC MOLECULAR INFLUENZA A AGN: NEGATIVE
POC MOLECULAR INFLUENZA B AGN: NEGATIVE
PROTEIN, UA POC OHS: ABNORMAL
SARS-COV-2 AG RESP QL IA.RAPID: NEGATIVE
SPECIFIC GRAVITY, UA POC OHS: 1.01
UROBILINOGEN, UA POC OHS: 0.2

## 2024-10-07 PROCEDURE — 81003 URINALYSIS AUTO W/O SCOPE: CPT | Mod: QW,S$GLB,, | Performed by: PHYSICIAN ASSISTANT

## 2024-10-07 PROCEDURE — 87502 INFLUENZA DNA AMP PROBE: CPT | Mod: QW,S$GLB,, | Performed by: PHYSICIAN ASSISTANT

## 2024-10-07 PROCEDURE — 99214 OFFICE O/P EST MOD 30 MIN: CPT | Mod: S$GLB,,, | Performed by: PHYSICIAN ASSISTANT

## 2024-10-07 PROCEDURE — 87811 SARS-COV-2 COVID19 W/OPTIC: CPT | Mod: QW,S$GLB,, | Performed by: PHYSICIAN ASSISTANT

## 2024-10-07 RX ORDER — PANTOPRAZOLE SODIUM 40 MG/1
40 TABLET, DELAYED RELEASE ORAL DAILY
Qty: 30 TABLET | Refills: 0 | Status: SHIPPED | OUTPATIENT
Start: 2024-10-07 | End: 2024-10-07

## 2024-10-07 RX ORDER — AMLODIPINE BESYLATE 5 MG/1
5 TABLET ORAL DAILY
Qty: 30 TABLET | Refills: 2 | Status: SHIPPED | OUTPATIENT
Start: 2024-10-07 | End: 2025-01-05

## 2024-10-07 RX ORDER — ONDANSETRON HYDROCHLORIDE 8 MG/1
8 TABLET, FILM COATED ORAL EVERY 8 HOURS PRN
Qty: 9 TABLET | Refills: 0 | Status: SHIPPED | OUTPATIENT
Start: 2024-10-07 | End: 2024-10-07

## 2024-10-07 RX ORDER — AMLODIPINE BESYLATE 5 MG/1
5 TABLET ORAL DAILY
Qty: 30 TABLET | Refills: 2 | Status: SHIPPED | OUTPATIENT
Start: 2024-10-07 | End: 2024-10-07

## 2024-10-07 RX ORDER — PANTOPRAZOLE SODIUM 40 MG/1
40 TABLET, DELAYED RELEASE ORAL DAILY
Qty: 30 TABLET | Refills: 0 | Status: SHIPPED | OUTPATIENT
Start: 2024-10-07 | End: 2024-11-06

## 2024-10-07 RX ORDER — ONDANSETRON HYDROCHLORIDE 8 MG/1
8 TABLET, FILM COATED ORAL EVERY 8 HOURS PRN
Qty: 9 TABLET | Refills: 0 | Status: SHIPPED | OUTPATIENT
Start: 2024-10-07 | End: 2024-10-10

## 2024-10-07 NOTE — PATIENT INSTRUCTIONS
Increase your water intake.  Use Zofran as needed for nausea.A referral was placed for you someone should contact you however if you do not hear from them in a week please call [phone] to schedule appointment.    You have symptoms consistent with heartburn also known as GERD. Take heartburn medication as prescribed before meals. Avoid spicy food, caffeine, chocolate, fried foods, carbonated drinks, acids such as lemon, oranges, limes and tomato sauces. GERD needs to be kept in control because it can cause harm to your esophagus and can also lead to an ulcer. You should follow up with your primary care provider to discuss long term treatment or possible lifestyle modifications to help control your symptoms. Sometimes if bad enough you may need to have an endoscopy by a gastroenterologist to evaluate your esophagus and stomach. Follow up with urgent care as needed and make an appointment with your primary care.

## 2024-10-07 NOTE — PROGRESS NOTES
"Subjective:      Patient ID: Eben Lagunas is a 44 y.o. male.    Vitals:  height is 6' 2" (1.88 m) and weight is 83.9 kg (185 lb). His oral temperature is 98.7 °F (37.1 °C). His blood pressure is 144/101 (abnormal) and his pulse is 69. His respiration is 16 and oxygen saturation is 97%.     Chief Complaint: Emesis    This is a 44 y.o. male who presents today with a chief complaint of vomiting that started on Friday. Pt did not eat food or drink. Pt has a hoarse voice. Pt is also here for BP refill .  Patient's last bowel movement was Saturday and was normal      Emesis   This is a new problem. The current episode started in the past 7 days. The problem occurs 2 to 4 times per day. The problem has been unchanged. The emesis has an appearance of bile and stomach contents. There has been no fever. Associated symptoms include chills and myalgias. Pertinent negatives include no abdominal pain, arthralgias, chest pain, coughing, decreased urine volume, diarrhea, dizziness, fever, headaches, sweats, URI or weight loss. Treatments tried: Zofran unknown mg. The treatment provided no relief.       Constitution: Positive for appetite change, chills, sweating and fatigue. Negative for fever.   HENT:  Positive for sore throat. Negative for ear pain, mouth sores, tongue pain, tongue lesion, congestion, postnasal drip, sinus pain, sinus pressure, trouble swallowing and voice change.    Neck: Negative for neck pain and neck stiffness.   Cardiovascular:  Negative for chest pain.   Respiratory:  Positive for chest tightness. Negative for cough, shortness of breath and wheezing.    Gastrointestinal:  Positive for nausea, vomiting and heartburn. Negative for abdominal pain, constipation and diarrhea.   Genitourinary:  Negative for dysuria, frequency, urgency, urine decreased and penile pain.   Musculoskeletal:  Positive for back pain and muscle ache. Negative for joint pain.   Skin:  Negative for rash.   Neurological:  Negative " for dizziness and headaches.    History reviewed. No pertinent past medical history.    History reviewed. No pertinent surgical history.    Family History   Problem Relation Name Age of Onset    No Known Problems Mother      Glaucoma Father         Social History     Socioeconomic History    Marital status: Single   Tobacco Use    Smoking status: Some Days     Current packs/day: 1.00     Average packs/day: 1 pack/day for 10.2 years (10.2 ttl pk-yrs)     Types: Cigarettes     Start date: 7/16/2014     Passive exposure: Current    Smokeless tobacco: Former   Substance and Sexual Activity    Alcohol use: Yes     Comment: socially       Current Outpatient Medications   Medication Sig Dispense Refill    cyclobenzaprine (FLEXERIL) 5 MG tablet One tablet t.i.d. p.r.n. for back pain and muscle spasm 20 tablet 0    ibuprofen (ADVIL,MOTRIN) 600 MG tablet Take 1 tablet (600 mg total) by mouth 3 (three) times daily as needed for Pain. 30 tablet 0    amLODIPine (NORVASC) 2.5 MG tablet Take 1 tablet (2.5 mg total) by mouth once daily. 60 tablet 0     No current facility-administered medications for this visit.       Review of patient's allergies indicates:  No Known Allergies    Objective:     Physical Exam   Constitutional: He appears well-developed. He is cooperative.  Non-toxic appearance. He does not appear ill. No distress.   HENT:   Head: Normocephalic and atraumatic.   Ears:   Right Ear: Hearing, tympanic membrane, external ear and ear canal normal. no impacted cerumen  Left Ear: Hearing, tympanic membrane, external ear and ear canal normal. no impacted cerumen  Nose: Nose normal. No mucosal edema, rhinorrhea, nasal deformity or congestion. No epistaxis. Right sinus exhibits no maxillary sinus tenderness and no frontal sinus tenderness. Left sinus exhibits no maxillary sinus tenderness and no frontal sinus tenderness.   Mouth/Throat: Uvula is midline and mucous membranes are normal. Mucous membranes are moist. No trismus  in the jaw. Normal dentition. No uvula swelling. Posterior oropharyngeal erythema present. No oropharyngeal exudate or posterior oropharyngeal edema.   Eyes: Conjunctivae and lids are normal. Right eye exhibits no discharge. Left eye exhibits no discharge. No scleral icterus.   Neck: Trachea normal and phonation normal. Neck supple. No edema present. No erythema present. No neck rigidity present.   Cardiovascular: Normal rate, regular rhythm and normal heart sounds.   No murmur heard.Exam reveals no gallop and no friction rub.   Pulmonary/Chest: Effort normal and breath sounds normal. No stridor. No respiratory distress. He has no decreased breath sounds. He has no wheezes. He has no rhonchi. He has no rales.   Abdominal: Normal appearance and bowel sounds are normal. He exhibits no distension and no mass. Soft. flat abdomen There is no abdominal tenderness. There is right CVA tenderness. There is no rebound, no guarding, no tenderness at McBurney's point, negative Smith's sign, no left CVA tenderness and negative Rovsing's sign. No hernia.   Musculoskeletal:      Cervical back: He exhibits no tenderness.   Lymphadenopathy:     He has no cervical adenopathy.   Neurological: He is alert. He exhibits normal muscle tone.   Skin: Skin is warm, dry, intact, not diaphoretic, not pale and no rash.   Psychiatric: His speech is normal and behavior is normal. Mood, judgment and thought content normal.   Nursing note and vitals reviewed.    Results for orders placed or performed in visit on 10/07/24   SARS Coronavirus 2 Antigen, POCT Manual Read    Collection Time: 10/07/24  1:00 PM   Result Value Ref Range    SARS Coronavirus 2 Antigen Negative Negative     Acceptable Yes    POCT Influenza A/B MOLECULAR    Collection Time: 10/07/24  1:00 PM   Result Value Ref Range    POC Molecular Influenza A Ag Negative Negative    POC Molecular Influenza B Ag Negative Negative     Acceptable Yes    POCT  Urinalysis(Instrument)    Collection Time: 10/07/24  1:00 PM   Result Value Ref Range    Color, POC UA Orange (A) Yellow, Straw, Colorless    Clarity, POC UA Slight Cloudy (A) Clear    Glucose, POC UA Negative Negative    Bilirubin, POC UA Small (A) Negative    Ketones, POC UA Negative Negative    Spec Grav POC UA 1.015 1.005 - 1.030    Blood, POC UA Trace-intact (A) Negative    pH, POC UA 6.5 5.0 - 8.0    Protein, POC UA Trace (A) Negative    Urobilinogen, POC UA 0.2 <=1.0    Nitrite, POC UA Negative Negative    WBC, POC UA Negative Negative         Assessment:     1. Vomiting, unspecified vomiting type, unspecified whether nausea present    2. Gastroesophageal reflux disease, unspecified whether esophagitis present    3. Encounter to establish care    4. Primary hypertension        Plan:       Vomiting, unspecified vomiting type, unspecified whether nausea present  -     SARS Coronavirus 2 Antigen, POCT Manual Read  -     POCT Influenza A/B MOLECULAR  -     POCT Urinalysis(Instrument)  -     ondansetron (ZOFRAN) 8 MG tablet; Take 1 tablet (8 mg total) by mouth every 8 (eight) hours as needed for Nausea.  Dispense: 9 tablet; Refill: 0    Gastroesophageal reflux disease, unspecified whether esophagitis present  -     pantoprazole (PROTONIX) 40 MG tablet; Take 1 tablet (40 mg total) by mouth once daily.  Dispense: 30 tablet; Refill: 0    Encounter to establish care  -     Ambulatory referral/consult to Internal Medicine    Primary hypertension  -     Ambulatory referral/consult to Internal Medicine  -     amLODIPine (NORVASC) 5 MG tablet; Take 1 tablet (5 mg total) by mouth once daily.  Dispense: 30 tablet; Refill: 2              Results reviewed  I have reviewed the patient chart and pertinent past imaging/labs.  Patient does not currently have nausea or heartburn declines any Zofran or GI cocktail at the clinic.  We discussed how I believe his GERD is causing nausea and vomiting.  Patient Instructions   Increase  your water intake.  Use Zofran as needed for nausea.A referral was placed for you someone should contact you however if you do not hear from them in a week please call [phone] to schedule appointment.    You have symptoms consistent with heartburn also known as GERD. Take heartburn medication as prescribed before meals. Avoid spicy food, caffeine, chocolate, fried foods, carbonated drinks, acids such as lemon, oranges, limes and tomato sauces. GERD needs to be kept in control because it can cause harm to your esophagus and can also lead to an ulcer. You should follow up with your primary care provider to discuss long term treatment or possible lifestyle modifications to help control your symptoms. Sometimes if bad enough you may need to have an endoscopy by a gastroenterologist to evaluate your esophagus and stomach. Follow up with urgent care as needed and make an appointment with your primary care.

## 2024-10-08 ENCOUNTER — HOSPITAL ENCOUNTER (EMERGENCY)
Facility: HOSPITAL | Age: 44
Discharge: HOME | End: 2024-10-08
Attending: EMERGENCY MEDICINE
Payer: COMMERCIAL

## 2024-10-08 VITALS
RESPIRATION RATE: 16 BRPM | HEART RATE: 56 BPM | SYSTOLIC BLOOD PRESSURE: 170 MMHG | WEIGHT: 190 LBS | TEMPERATURE: 98 F | OXYGEN SATURATION: 100 % | BODY MASS INDEX: 24.38 KG/M2 | HEIGHT: 74 IN | DIASTOLIC BLOOD PRESSURE: 93 MMHG

## 2024-10-08 DIAGNOSIS — R11.0 NAUSEA: ICD-10-CM

## 2024-10-08 DIAGNOSIS — M54.6 BILATERAL THORACIC BACK PAIN, UNSPECIFIED CHRONICITY: Primary | ICD-10-CM

## 2024-10-08 LAB
ALBUMIN SERPL BCP-MCNC: 4.2 G/DL (ref 3.5–5.2)
ALP SERPL-CCNC: 123 U/L (ref 55–135)
ALT SERPL W/O P-5'-P-CCNC: 24 U/L (ref 10–44)
ANION GAP SERPL CALC-SCNC: 8 MMOL/L (ref 8–16)
AST SERPL-CCNC: 15 U/L (ref 10–40)
BASOPHILS # BLD AUTO: 0.02 K/UL (ref 0–0.2)
BASOPHILS NFR BLD: 0.2 % (ref 0–1.9)
BILIRUB SERPL-MCNC: 0.9 MG/DL (ref 0.1–1)
BILIRUB UR QL STRIP: NEGATIVE
BUN SERPL-MCNC: 12 MG/DL (ref 6–20)
BUN SERPL-MCNC: 13 MG/DL (ref 6–30)
CALCIUM SERPL-MCNC: 9.9 MG/DL (ref 8.7–10.5)
CHLORIDE SERPL-SCNC: 100 MMOL/L (ref 95–110)
CHLORIDE SERPL-SCNC: 105 MMOL/L (ref 95–110)
CLARITY UR REFRACT.AUTO: CLEAR
CO2 SERPL-SCNC: 26 MMOL/L (ref 23–29)
COLOR UR AUTO: YELLOW
CREAT SERPL-MCNC: 1.3 MG/DL (ref 0.5–1.4)
CREAT SERPL-MCNC: 1.4 MG/DL (ref 0.5–1.4)
DIFFERENTIAL METHOD BLD: NORMAL
EOSINOPHIL # BLD AUTO: 0.1 K/UL (ref 0–0.5)
EOSINOPHIL NFR BLD: 0.8 % (ref 0–8)
ERYTHROCYTE [DISTWIDTH] IN BLOOD BY AUTOMATED COUNT: 13.7 % (ref 11.5–14.5)
EST. GFR  (NO RACE VARIABLE): >60 ML/MIN/1.73 M^2
GLUCOSE SERPL-MCNC: 96 MG/DL (ref 70–110)
GLUCOSE SERPL-MCNC: 98 MG/DL (ref 70–110)
GLUCOSE UR QL STRIP: NEGATIVE
HCT VFR BLD AUTO: 47.2 % (ref 40–54)
HCT VFR BLD CALC: 48 %PCV (ref 36–54)
HGB BLD-MCNC: 15.8 G/DL (ref 14–18)
HGB UR QL STRIP: NEGATIVE
IMM GRANULOCYTES # BLD AUTO: 0.03 K/UL (ref 0–0.04)
IMM GRANULOCYTES NFR BLD AUTO: 0.3 % (ref 0–0.5)
KETONES UR QL STRIP: NEGATIVE
LEUKOCYTE ESTERASE UR QL STRIP: NEGATIVE
LIPASE SERPL-CCNC: 16 U/L (ref 4–60)
LYMPHOCYTES # BLD AUTO: 2.7 K/UL (ref 1–4.8)
LYMPHOCYTES NFR BLD: 28.6 % (ref 18–48)
MCH RBC QN AUTO: 31 PG (ref 27–31)
MCHC RBC AUTO-ENTMCNC: 33.5 G/DL (ref 32–36)
MCV RBC AUTO: 93 FL (ref 82–98)
MONOCYTES # BLD AUTO: 0.8 K/UL (ref 0.3–1)
MONOCYTES NFR BLD: 8 % (ref 4–15)
NEUTROPHILS # BLD AUTO: 5.9 K/UL (ref 1.8–7.7)
NEUTROPHILS NFR BLD: 62.1 % (ref 38–73)
NITRITE UR QL STRIP: NEGATIVE
NRBC BLD-RTO: 0 /100 WBC
PH UR STRIP: 7 [PH] (ref 5–8)
PLATELET # BLD AUTO: 193 K/UL (ref 150–450)
PMV BLD AUTO: 11 FL (ref 9.2–12.9)
POC IONIZED CALCIUM: 1.21 MMOL/L (ref 1.06–1.42)
POC TCO2 (MEASURED): 27 MMOL/L (ref 23–29)
POTASSIUM BLD-SCNC: 4.1 MMOL/L (ref 3.5–5.1)
POTASSIUM SERPL-SCNC: 3.9 MMOL/L (ref 3.5–5.1)
PROT SERPL-MCNC: 7.3 G/DL (ref 6–8.4)
PROT UR QL STRIP: ABNORMAL
RBC # BLD AUTO: 5.09 M/UL (ref 4.6–6.2)
SAMPLE: NORMAL
SODIUM BLD-SCNC: 140 MMOL/L (ref 136–145)
SODIUM SERPL-SCNC: 139 MMOL/L (ref 136–145)
SP GR UR STRIP: 1.03 (ref 1–1.03)
URN SPEC COLLECT METH UR: ABNORMAL
WBC # BLD AUTO: 9.48 K/UL (ref 3.9–12.7)

## 2024-10-08 PROCEDURE — 83690 ASSAY OF LIPASE: CPT | Performed by: EMERGENCY MEDICINE

## 2024-10-08 PROCEDURE — 81003 URINALYSIS AUTO W/O SCOPE: CPT | Performed by: EMERGENCY MEDICINE

## 2024-10-08 PROCEDURE — 82308 ASSAY OF CALCITONIN: CPT

## 2024-10-08 PROCEDURE — 63600175 PHARM REV CODE 636 W HCPCS: Performed by: PHYSICIAN ASSISTANT

## 2024-10-08 PROCEDURE — 85025 COMPLETE CBC W/AUTO DIFF WBC: CPT | Performed by: EMERGENCY MEDICINE

## 2024-10-08 PROCEDURE — 80047 BASIC METABLC PNL IONIZED CA: CPT

## 2024-10-08 PROCEDURE — 96375 TX/PRO/DX INJ NEW DRUG ADDON: CPT

## 2024-10-08 PROCEDURE — 96374 THER/PROPH/DIAG INJ IV PUSH: CPT

## 2024-10-08 PROCEDURE — 96361 HYDRATE IV INFUSION ADD-ON: CPT

## 2024-10-08 PROCEDURE — 80053 COMPREHEN METABOLIC PANEL: CPT | Performed by: EMERGENCY MEDICINE

## 2024-10-08 PROCEDURE — 99285 EMERGENCY DEPT VISIT HI MDM: CPT | Mod: 25

## 2024-10-08 RX ORDER — PROMETHAZINE HYDROCHLORIDE 25 MG/1
25 TABLET ORAL EVERY 6 HOURS PRN
Qty: 15 TABLET | Refills: 0 | Status: SHIPPED | OUTPATIENT
Start: 2024-10-08 | End: 2024-10-15

## 2024-10-08 RX ORDER — ONDANSETRON HYDROCHLORIDE 2 MG/ML
4 INJECTION, SOLUTION INTRAVENOUS
Status: COMPLETED | OUTPATIENT
Start: 2024-10-08 | End: 2024-10-08

## 2024-10-08 RX ORDER — PROMETHAZINE HYDROCHLORIDE 25 MG/1
25 TABLET ORAL EVERY 6 HOURS PRN
Qty: 15 TABLET | Refills: 0 | Status: SHIPPED | OUTPATIENT
Start: 2024-10-08 | End: 2024-10-08

## 2024-10-08 RX ORDER — LIDOCAINE 50 MG/G
1 PATCH TOPICAL DAILY
Qty: 30 PATCH | Refills: 0 | Status: SHIPPED | OUTPATIENT
Start: 2024-10-08 | End: 2024-11-07

## 2024-10-08 RX ORDER — METHOCARBAMOL 500 MG/1
1000 TABLET, FILM COATED ORAL 3 TIMES DAILY
Qty: 30 TABLET | Refills: 0 | Status: SHIPPED | OUTPATIENT
Start: 2024-10-08 | End: 2024-10-08

## 2024-10-08 RX ORDER — NAPROXEN 500 MG/1
500 TABLET ORAL 2 TIMES DAILY WITH MEALS
Qty: 20 TABLET | Refills: 0 | Status: SHIPPED | OUTPATIENT
Start: 2024-10-08 | End: 2024-10-08

## 2024-10-08 RX ORDER — METHOCARBAMOL 500 MG/1
1000 TABLET, FILM COATED ORAL 3 TIMES DAILY
Qty: 30 TABLET | Refills: 0 | Status: SHIPPED | OUTPATIENT
Start: 2024-10-08 | End: 2024-10-13

## 2024-10-08 RX ORDER — KETOROLAC TROMETHAMINE 30 MG/ML
10 INJECTION, SOLUTION INTRAMUSCULAR; INTRAVENOUS
Status: COMPLETED | OUTPATIENT
Start: 2024-10-08 | End: 2024-10-08

## 2024-10-08 RX ORDER — NAPROXEN 500 MG/1
500 TABLET ORAL 2 TIMES DAILY WITH MEALS
Qty: 20 TABLET | Refills: 0 | Status: SHIPPED | OUTPATIENT
Start: 2024-10-08 | End: 2024-10-18

## 2024-10-08 RX ORDER — LIDOCAINE 50 MG/G
1 PATCH TOPICAL DAILY
Qty: 30 PATCH | Refills: 0 | Status: SHIPPED | OUTPATIENT
Start: 2024-10-08 | End: 2024-10-08

## 2024-10-08 RX ADMIN — SODIUM CHLORIDE, POTASSIUM CHLORIDE, SODIUM LACTATE AND CALCIUM CHLORIDE 1000 ML: 600; 310; 30; 20 INJECTION, SOLUTION INTRAVENOUS at 02:10

## 2024-10-08 RX ADMIN — ONDANSETRON 4 MG: 2 INJECTION INTRAMUSCULAR; INTRAVENOUS at 02:10

## 2024-10-08 RX ADMIN — KETOROLAC TROMETHAMINE 10 MG: 30 INJECTION, SOLUTION INTRAMUSCULAR; INTRAVENOUS at 02:10

## 2024-10-08 NOTE — DISCHARGE INSTRUCTIONS
Your blood work did not show any abnormalities.  Your urine did not show any evidence of infection.  Your CT scan did not show any signs of kidney stones or other abnormalities.  I have prescribed you medications to help with your back pain including a anti-inflammatories, muscle relaxer and topical pain patch.  I have also prescribed you a medication to help with nausea.  If you continue to have symptoms you will need to follow-up with your primary care doctor.  You may return to ED sooner if you develop any new or worsening symptoms.

## 2024-10-08 NOTE — ED NOTES
CERTIFICATE OF RETURN TO WORK      This is to certify that Nessa Rubio has been under my care from 12/11/2020 and can return to regular work on 12/14/2020.          RESTRICTIONS:  none        REMARKS:  COVID PCR negative.          Sincerely,          Yumiko Richey M.D.  [facility]  [id number] [place], [place]  [id number] [phone]               I-STAT Chem-8+ Results:   Value Reference Range   Sodium 140 136-145 mmol/L   Potassium  4.1 3.5-5.1 mmol/L   Chloride 100  mmol/L   Ionized Calcium 1.21 1.06-1.42 mmol/L   CO2 (measured) 27 23-29 mmol/L   Glucose 98  mg/dL   BUN 13 6-30 mg/dL   Creatinine 1.4 0.5-1.4 mg/dL   Hematocrit 48 36-54%

## 2024-10-08 NOTE — ED PROVIDER NOTES
Encounter Date: 10/8/2024       History     Chief Complaint   Patient presents with    Back Pain     Pt c/o right mid back pain and n/v  Onset Friday.  Went to  yesterday-given Rx for nausea meds.  States still feels bad.  Denies urinary symptom or injury.      44-year-old male presents ED for right midthoracic back pain and nausea vomiting.  Has been ongoing for 5 days states he has been unable to keep anything down.  Seen at urgent care prescribed p.o. Zofran and still unable to tolerate p.o..  Describes it as a sharp stabbing pain occasionally radiates up and down his back is worse with movement.  Denies any radiation to the abdomen or testicle.  No chest pain, shortness breath, dizziness, fevers/chills, dysuria, hematuria or history of kidney stones.    The history is provided by the patient.     Review of patient's allergies indicates:  No Known Allergies  History reviewed. No pertinent past medical history.  History reviewed. No pertinent surgical history.  Family History   Problem Relation Name Age of Onset    No Known Problems Mother      Glaucoma Father       Social History     Tobacco Use    Smoking status: Some Days     Current packs/day: 1.00     Average packs/day: 1 pack/day for 10.2 years (10.2 ttl pk-yrs)     Types: Cigarettes     Start date: 7/16/2014     Passive exposure: Current    Smokeless tobacco: Former   Substance Use Topics    Alcohol use: Yes     Comment: socially     Review of Systems    Physical Exam     Initial Vitals [10/08/24 1221]   BP Pulse Resp Temp SpO2   (!) 181/109 77 16 97.3 °F (36.3 °C) 100 %      MAP       --         Physical Exam    Nursing note and vitals reviewed.  Constitutional: He appears well-developed and well-nourished.   HENT:   Head: Normocephalic and atraumatic.   Eyes: Conjunctivae are normal. Pupils are equal, round, and reactive to light.   Neck: Neck supple.   Normal range of motion.  Cardiovascular:  Normal rate.           Pulmonary/Chest: Breath sounds  normal. No respiratory distress.   Abdominal: Abdomen is soft. There is no abdominal tenderness.   Right CVA is tender to both light touch and percussion   Musculoskeletal:         General: Normal range of motion.      Cervical back: Normal range of motion and neck supple.     Neurological: He is alert and oriented to person, place, and time. GCS score is 15. GCS eye subscore is 4. GCS verbal subscore is 5. GCS motor subscore is 6.   Skin: Skin is warm and dry. Capillary refill takes less than 2 seconds.         ED Course   Procedures  Labs Reviewed   URINALYSIS, REFLEX TO URINE CULTURE - Abnormal       Result Value    Specimen UA Urine, Clean Catch      Color, UA Yellow      Appearance, UA Clear      pH, UA 7.0      Specific Gravity, UA 1.030      Protein, UA Trace (*)     Glucose, UA Negative      Ketones, UA Negative      Bilirubin (UA) Negative      Occult Blood UA Negative      Nitrite, UA Negative      Leukocytes, UA Negative      Narrative:     Specimen Source->Urine   CBC W/ AUTO DIFFERENTIAL    WBC 9.48      RBC 5.09      Hemoglobin 15.8      Hematocrit 47.2      MCV 93      MCH 31.0      MCHC 33.5      RDW 13.7      Platelets 193      MPV 11.0      Immature Granulocytes 0.3      Gran # (ANC) 5.9      Immature Grans (Abs) 0.03      Lymph # 2.7      Mono # 0.8      Eos # 0.1      Baso # 0.02      nRBC 0      Gran % 62.1      Lymph % 28.6      Mono % 8.0      Eosinophil % 0.8      Basophil % 0.2      Differential Method Automated     COMPREHENSIVE METABOLIC PANEL    Sodium 139      Potassium 3.9      Chloride 105      CO2 26      Glucose 96      BUN 12      Creatinine 1.3      Calcium 9.9      Total Protein 7.3      Albumin 4.2      Total Bilirubin 0.9      Alkaline Phosphatase 123      AST 15      ALT 24      eGFR >60.0      Anion Gap 8     LIPASE    Lipase 16     ISTAT PROCEDURE    POC Glucose 98      POC BUN 13      POC Creatinine 1.4      POC Sodium 140      POC Potassium 4.1      POC Chloride 100       POC TCO2 (MEASURED) 27      POC Ionized Calcium 1.21      POC Hematocrit 48      Sample KEVIN     ISTAT CHEM8          Imaging Results              CT Renal Stone Study ABD Pelvis WO (Final result)  Result time 10/08/24 15:12:16      Final result by Jamil Hayward MD (10/08/24 15:12:16)                   Impression:      No acute abnormalities in the abdomen or pelvis.    Small bilateral renal stones.  No hydronephrosis.      Electronically signed by: Jamil Hayward MD  Date:    10/08/2024  Time:    15:12               Narrative:    EXAMINATION:  CT RENAL STONE STUDY ABD PELVIS WO    CLINICAL HISTORY:  Flank pain, kidney stone suspected;    TECHNIQUE:  Low dose axial images, sagittal and coronal reformations were obtained from the lung bases to the pubic symphysis.  Contrast was not administered.    COMPARISON:  None    FINDINGS:  The imaged portions of the lung bases and heart show no significant abnormalities.    Liver, gallbladder spleen, pancreas and adrenal glands show no significant abnormalities.  There is a small air/fluid outpouching posterior to the gastric fundus likely a gastric diverticulum.    There are multiple small bilateral renal stones.  There are no ureteral stones and no hydronephrosis.  Urinary bladder is nondistended.  Prostate gland and pelvic structures show no significant abnormalities.  Small hypodensity in the upper pole of the right kidney likely a cyst.    The small and large bowel show no acute inflammation or obstruction.  No free air or ascites.  Appendix is normal.    The abdominal aorta is normal in caliber.  There is no abnormal lymph node enlargement.    Osseous structures show no significant abnormalities.                                       Medications   ketorolac injection 9.999 mg (9.999 mg Intravenous Given 10/8/24 1424)   lactated ringers bolus 1,000 mL (0 mLs Intravenous Stopped 10/8/24 1525)   ondansetron injection 4 mg (4 mg Intravenous Given 10/8/24 1424)     Medical  Decision Making  44-year-old male presents ED nausea vomiting and back pain.      Differential includes but not limited to nephrolithiasis, muscle spasm, electrolyte derangement, dehydration    Has pain to the bilateral midthoracic back.  Tender to light touch which may suggest this may be muscular however given his report of increased urinary frequency will obtain urologic workup.  Labs are unremarkable UA negative for UTI.  CT renal stone negative for obstructive uropathy.  Tolerating p.o. after fluids and IV Zofran.  Symptoms not consistent with dissection.  Suspect this is muscular will treat with NSAIDs, Robaxin, Lidoderm and Phenergan for nausea.  Discussed PCP follow-up as needed.  Return ED precautions given.    Amount and/or Complexity of Data Reviewed  Radiology: ordered.    Risk  Prescription drug management.                                      Clinical Impression:  Final diagnoses:  [M54.6] Bilateral thoracic back pain, unspecified chronicity (Primary)  [R11.0] Nausea                 Andrea Morris PA-C  10/08/24 1538       Andrea Morris PA-C  10/08/24 1539

## 2024-10-08 NOTE — ED TRIAGE NOTES
Eben Alphonso Lagunas, a 44 y.o. male presents to the ED w/ complaint of right flank pain  Triage note:  Chief Complaint   Patient presents with    Back Pain     Pt c/o right mid back pain and n/v  Onset Friday.  Went to  yesterday-given Rx for nausea meds.  States still feels bad.  Denies urinary symptom or injury.      Review of patient's allergies indicates:  No Known Allergies  History reviewed. No pertinent past medical history.

## 2024-10-08 NOTE — ED NOTES
Patient identifiers for Eben Lagunas 44 y.o. male checked and correct.  Chief Complaint   Patient presents with    Back Pain     Pt c/o right mid back pain and n/v  Onset Friday.  Went to  yesterday-given Rx for nausea meds.  States still feels bad.  Denies urinary symptom or injury.      History reviewed. No pertinent past medical history.  Allergies reported: Review of patient's allergies indicates:  No Known Allergies    Appearance: Pt awake, alert & oriented to person, place & time. Pt in no acute distress at present time. Pt is clean and well groomed with clothes appropriately fastened.   Skin: Skin warm, dry & intact. Color consistent with ethnicity. Mucous membranes moist. No breakdown or brusing noted.   Musculoskeletal: Patient moving all extremities well, no obvious swelling or deformities noted. Lower right back pain  Respiratory: Respirations spontaneous, even, and non-labored. Visible chest rise noted. Airway is open and patent. No accessory muscle use noted.   Neurologic: Sensation is intact. Speech is clear and appropriate. Eyes open spontaneously, behavior appropriate to situation, follows commands, facial expression symmetrical, bilateral hand grasp equal and even, purposeful motor response noted.  Cardiac: All peripheral pulses present. No Bilateral lower extremity edema. Cap refill is <3 seconds.  Abdomen: Abdomen soft, non distended, non tender to palpation.   : Pt voids independently, denies dysuria, hematuria, frequency.

## 2024-10-24 ENCOUNTER — OFFICE VISIT (OUTPATIENT)
Dept: INTERNAL MEDICINE | Facility: CLINIC | Age: 44
End: 2024-10-24
Payer: COMMERCIAL

## 2024-10-24 ENCOUNTER — HOSPITAL ENCOUNTER (OUTPATIENT)
Dept: RADIOLOGY | Facility: OTHER | Age: 44
Discharge: HOME | End: 2024-10-24
Payer: COMMERCIAL

## 2024-10-24 VITALS
WEIGHT: 189.81 LBS | SYSTOLIC BLOOD PRESSURE: 140 MMHG | HEART RATE: 80 BPM | OXYGEN SATURATION: 99 % | BODY MASS INDEX: 24.36 KG/M2 | DIASTOLIC BLOOD PRESSURE: 112 MMHG | HEIGHT: 74 IN

## 2024-10-24 DIAGNOSIS — M25.511 CHRONIC RIGHT SHOULDER PAIN: ICD-10-CM

## 2024-10-24 DIAGNOSIS — Z00.00 ANNUAL PHYSICAL EXAM: Primary | ICD-10-CM

## 2024-10-24 DIAGNOSIS — K31.4 GASTRIC DIVERTICULUM: ICD-10-CM

## 2024-10-24 DIAGNOSIS — G89.29 CHRONIC RIGHT SHOULDER PAIN: ICD-10-CM

## 2024-10-24 DIAGNOSIS — K21.9 GASTROESOPHAGEAL REFLUX DISEASE, UNSPECIFIED WHETHER ESOPHAGITIS PRESENT: ICD-10-CM

## 2024-10-24 DIAGNOSIS — F32.1 CURRENT MODERATE EPISODE OF MAJOR DEPRESSIVE DISORDER WITHOUT PRIOR EPISODE: ICD-10-CM

## 2024-10-24 DIAGNOSIS — I10 HYPERTENSION, UNSPECIFIED TYPE: ICD-10-CM

## 2024-10-24 DIAGNOSIS — R11.0 NAUSEA: ICD-10-CM

## 2024-10-24 PROCEDURE — 73030 X-RAY EXAM OF SHOULDER: CPT | Mod: 26,RT,, | Performed by: RADIOLOGY

## 2024-10-24 PROCEDURE — 99999 PR PBB SHADOW E&M-EST. PATIENT-LVL V: CPT | Mod: PBBFAC,,,

## 2024-10-24 PROCEDURE — 73030 X-RAY EXAM OF SHOULDER: CPT | Mod: TC,FY,RT

## 2024-10-24 RX ORDER — CYCLOBENZAPRINE HCL 5 MG
TABLET ORAL
Qty: 30 TABLET | Refills: 0 | Status: SHIPPED | OUTPATIENT
Start: 2024-10-24

## 2024-10-24 RX ORDER — PROMETHAZINE HYDROCHLORIDE 12.5 MG/1
12.5 TABLET ORAL DAILY PRN
Qty: 20 TABLET | Refills: 0 | Status: SHIPPED | OUTPATIENT
Start: 2024-10-24

## 2024-10-24 RX ORDER — DICLOFENAC SODIUM 10 MG/G
2 GEL TOPICAL 4 TIMES DAILY
Qty: 450 G | Refills: 1 | Status: SHIPPED | OUTPATIENT
Start: 2024-10-24

## 2024-10-24 RX ORDER — PANTOPRAZOLE SODIUM 40 MG/1
40 TABLET, DELAYED RELEASE ORAL DAILY
Qty: 30 TABLET | Refills: 3 | Status: SHIPPED | OUTPATIENT
Start: 2024-10-24 | End: 2025-02-21

## 2024-10-24 NOTE — PROGRESS NOTES
"  History & Physical  [facility]      SUBJECTIVE:     History of Present Illness:  Patient is a 44 y.o. male presents to clinic to establish care. Current diagnoses include chronic pain of shoulder, HTN,     #Abdominal/back pain/hematochezia  He reports chronic intermittent abdominal and back pain present for years. He describes that the pain mainly manifests alongside his right flank as a stabbing sensation. Some episodes are intense that it significantly impacts his daily activities. He also notes that his abdominal pain can feel like "a gas type of feeling" which can come and go. Nausea is sometimes associated with these abdominal pain episodes. Patient stated that earlier this month he went to the ED due to intense pain around his right flank region. He was evaluated and imaging was completed. No major abnormalities were found on CT renal/abd/pelvis however a small air/fluid outpouching posterior to the gastric fundus likely a gastric diverticulum. Patient's symptoms resolved and was d/c from ED. Since that time patient now reports findings of bright red blood in his stool for approximately 10-12 days, describing it as "small bright red blood spots covering parts of the stool". He denies any recent hx of constipation or painful bowel movements. He denies any major changes to his stool but has noted intermittent diarrhea in these past two weeks as well. Patient states that his hematochezia does not occur with every BM but states "every other one". It has been two days since seeing [facility]. Patient has had hx of external hemorrhoids but states that "these symptoms are different from his hemorrhoids". He was recently also prescribed Protonix (pantoprazole) 40 mg for acid reflux, recently prescribed by urgent care. Patient denies fevers, chills, or recent weight changes.    #HTN  He reports missing his hypertension medication (amlodipine 5mg dose) for two consecutive days due to " forgetfulness. He denies checking his blood pressure at home. Patient states he has not done a good job in keeping up with his amlodipine use. BP elevated today. Patient denies chest pain, palpitations, LOC.     #Low mood  He reports feeling depressed for the past two months, attributing this to recent relationship issues, including a breakup with his girlfriend and ongoing conflicts with his child's mother. He denies current suicidal ideation and expresses interest in potentially pursuing therapy. Patient not interested in medications for mood at this time.    #Chronic shoulder pain  He also reports chronic shoulder pain from an old basketball injury, worsening with age and significantly impacting daily activities. He describes difficulty with shoulder rotation, pain extending to the scapula area, and cracking sensations in the joint. He expresses frustration with the limitations caused by the pain, stating he can barely perform certain movements. He denies recent shoulder imaging, noting the last x-rays were taken around the time of the initial injury.       Immunizations UTD  Last colonoscopy- N/A  Last HgbA1C- due  Last lipid panel- due    Smoker- Current smoker, 1 pack a day, not interested in medications for help   EtOH use- socially on weekends (non specific on amount of drinks)  Drug use- No drug use    Review of patient's allergies indicates:  No Known Allergies    Past Medical History:   Diagnosis Date    Hypertension     Stroke 2023     History reviewed. No pertinent surgical history.  Family History   Problem Relation Name Age of Onset    No Known Problems Mother      Glaucoma Father      Cancer Paternal Aunt      Diabetes Maternal Grandmother       Social History     Tobacco Use    Smoking status: Every Day     Current packs/day: 1.00     Average packs/day: 1 pack/day for 10.3 years (10.3 ttl pk-yrs)     Types: Cigarettes     Start date: 7/16/2014     Passive exposure: Current    Smokeless tobacco:  "Former   Substance Use Topics    Alcohol use: Yes     Comment: socially    Drug use: Never        OBJECTIVE:     Vital Signs (Most Recent)  Vitals:    10/24/24 1148   BP: (!) 140/112   BP Location: Left arm   Patient Position: Sitting   Pulse: 80   SpO2: 99%   Weight: 86.1 kg (189 lb 13.1 oz)   Height: 6' 2" (1.88 m)         Physical Exam  Constitutional:       General: He is not in acute distress.     Appearance: Normal appearance. He is not toxic-appearing.   HENT:      Head: Normocephalic and atraumatic.      Right Ear: External ear normal.      Left Ear: External ear normal.      Nose: Nose normal.      Mouth/Throat:      Mouth: Mucous membranes are moist.   Eyes:      Extraocular Movements: Extraocular movements intact.   Cardiovascular:      Rate and Rhythm: Normal rate and regular rhythm.      Pulses: Normal pulses.      Heart sounds: Normal heart sounds.   Pulmonary:      Effort: Pulmonary effort is normal. No respiratory distress.   Abdominal:      General: Abdomen is flat. There is no distension.      Palpations: Abdomen is soft. There is no mass.      Tenderness: There is no abdominal tenderness. There is no guarding or rebound.   Genitourinary:     Comments: Rectal exam deferred  Musculoskeletal:      Cervical back: Normal range of motion and neck supple.      Comments: TTP to AC joint space of right shoulder   Skin:     General: Skin is warm.      Findings: No bruising or erythema.   Neurological:      General: No focal deficit present.      Mental Status: He is alert.   Psychiatric:         Mood and Affect: Mood normal.           ASSESSMENT/PLAN:   44 y.o.male presents to clinic to establish care.    Evaluated abdominal/back pain and rectal bleeding in context of recent ER visit findings of gastric diverticulum. Small and large bowel show no acute inflammation or obstruction. No free air or ascites.    Considered possible etiology of gastric diverticulum as cause of symptoms however unsure of exact " etiology at this time. Will at this time refer [facility] for further eval/possibility of endo/colonoscopy. Patient to continue use of protonix 40mg daily   Abdominal physical exam benign today, ER precautions given to patient if patient develops dizziness, syncope, palpitations, or chest pain,severe abdominal pain, worsening fatigue, shortness of breath, fevers, chills to be evaluated for acute GI bleed  Assessed chronic shoulder pain, suspecting possible osteoarthritis, xrays undertaken, patient deferring PT    Screened for depression due to elevated mood assessment score, patient meeting criteria for MDD dx, referral to psychotherapy placed  Patient with elevated BP, likely in the setting of medication non-adherence, will restart amlodipine 5mg and patient to follow up with nurse visit in 2 weeks for BP check  General labs obtained    Eben was seen today for establish care, annual exam, abdominal pain, rectal bleeding, shoulder pain and back pain.    Diagnoses and all orders for this visit:    Annual physical exam  -     Hemoglobin A1C; Future  -     CBC Auto Differential; Future  -     HIV 1/2 Ag/Ab (4th Gen); Future  -     Hepatitis C Antibody; Future  -     Lipid Panel; Future    Chronic right shoulder pain  -     X-ray Shoulder 2 or More Views Right; Future  -     diclofenac sodium (VOLTAREN ARTHRITIS PAIN) 1 % Gel; Apply 2 g topically 4 (four) times daily.  -     cyclobenzaprine (FLEXERIL) 5 MG tablet; One tablet t.i.d. p.r.n. for back pain and muscle spasm    Gastric diverticulum  -     Ambulatory referral/consult to Gastroenterology; Future  -     Cancel: Fl Modified Barium Swallow Speech; Future  -     Cancel: SLP video swallow; Future    Gastroesophageal reflux disease, unspecified whether esophagitis present  -     pantoprazole (PROTONIX) 40 MG tablet; Take 1 tablet (40 mg total) by mouth once daily.    Nausea  -     promethazine (PHENERGAN) 12.5 MG Tab; Take 1 tablet (12.5 mg total) by mouth  daily as needed.    Current moderate episode of major depressive disorder without prior episode  -     Ambulatory referral/consult to Psychology; Future        Follow-up: 2 weeks for nurse visit for BP check     This note was generated with the assistance of ambient listening technology. Verbal consent was obtained by the patient and accompanying visitor(s) for the recording of patient appointment to facilitate this note. I attest to having reviewed and edited the generated note for accuracy, though some syntax or spelling errors may persist. Please contact the author of this note for any clarification.      Reid Saab MD  [facility]

## 2024-10-29 ENCOUNTER — TELEPHONE (OUTPATIENT)
Dept: INTERNAL MEDICINE | Facility: CLINIC | Age: 44
End: 2024-10-29
Payer: COMMERCIAL

## 2024-11-07 ENCOUNTER — OFFICE VISIT (OUTPATIENT)
Dept: URGENT CARE | Facility: CLINIC | Age: 44
End: 2024-11-07
Payer: COMMERCIAL

## 2024-11-07 VITALS
DIASTOLIC BLOOD PRESSURE: 100 MMHG | SYSTOLIC BLOOD PRESSURE: 142 MMHG | HEART RATE: 87 BPM | HEIGHT: 74 IN | OXYGEN SATURATION: 98 % | TEMPERATURE: 99 F | BODY MASS INDEX: 24.64 KG/M2 | WEIGHT: 192 LBS | RESPIRATION RATE: 16 BRPM

## 2024-11-07 DIAGNOSIS — L73.9 FOLLICULITIS: Primary | ICD-10-CM

## 2024-11-07 PROCEDURE — 99213 OFFICE O/P EST LOW 20 MIN: CPT | Mod: S$GLB,,, | Performed by: PHYSICIAN ASSISTANT

## 2024-11-07 RX ORDER — DOXYCYCLINE HYCLATE 100 MG
100 TABLET ORAL 2 TIMES DAILY
Qty: 14 TABLET | Refills: 0 | Status: SHIPPED | OUTPATIENT
Start: 2024-11-07 | End: 2024-11-14

## 2024-11-07 NOTE — PROGRESS NOTES
"Subjective:      Patient ID: Eben Lagunas is a 44 y.o. male.    Vitals:  height is 6' 2" (1.88 m) and weight is 87.1 kg (192 lb). His temporal temperature is 98.5 °F (36.9 °C). His blood pressure is 154/120 (abnormal) and his pulse is 87. His respiration is 16 and oxygen saturation is 98%.     Chief Complaint: Rash (To the skin of genital.)    Patient is a 44 y.o. male with the complaint of a rash located to the skin of groin that he noticed on yesterday after shaving, he reports with a similar rash back in his 20's however, is unsure if it is the same.  Patient denies any concern for STDs    Rash  This is a new problem. The current episode started in the past 7 days. The problem has been gradually worsening since onset. The affected locations include the groin. The rash is characterized by blistering. He was exposed to nothing. Pertinent negatives include no anorexia, facial edema, fever, joint pain, nail changes or rhinorrhea. Past treatments include nothing. There is no history of allergies, asthma, eczema or varicella.       Constitution: Negative for chills and fever.   Genitourinary:  Positive for genital trauma and genital sore. Negative for dysuria, frequency, urgency, penile discharge, painful ejaculation, penile pain, penile swelling, scrotal swelling, testicular pain and pelvic pain.   Skin:  Negative for rash, erythema and bruising.      Past Medical History:   Diagnosis Date    Hypertension     Stroke 2023       History reviewed. No pertinent surgical history.    Family History   Problem Relation Name Age of Onset    No Known Problems Mother      Glaucoma Father      Cancer Paternal Aunt      Diabetes Maternal Grandmother         Social History     Socioeconomic History    Marital status: Single   Tobacco Use    Smoking status: Every Day     Current packs/day: 1.00     Average packs/day: 1 pack/day for 10.3 years (10.3 ttl pk-yrs)     Types: Cigarettes     Start date: 7/16/2014     Passive " exposure: Current    Smokeless tobacco: Former   Substance and Sexual Activity    Alcohol use: Yes     Comment: socially    Drug use: Never    Sexual activity: Not Currently       Current Outpatient Medications   Medication Sig Dispense Refill    amLODIPine (NORVASC) 5 MG tablet Take 1 tablet (5 mg total) by mouth once daily. 30 tablet 2    cyclobenzaprine (FLEXERIL) 5 MG tablet One tablet t.i.d. p.r.n. for back pain and muscle spasm 30 tablet 0    diclofenac sodium (VOLTAREN ARTHRITIS PAIN) 1 % Gel Apply 2 g topically 4 (four) times daily. 450 g 1    ibuprofen (ADVIL,MOTRIN) 600 MG tablet Take 1 tablet (600 mg total) by mouth 3 (three) times daily as needed for Pain. 30 tablet 0    pantoprazole (PROTONIX) 40 MG tablet Take 1 tablet (40 mg total) by mouth once daily. 30 tablet 3    promethazine (PHENERGAN) 12.5 MG Tab Take 1 tablet (12.5 mg total) by mouth daily as needed. 20 tablet 0    doxycycline (VIBRA-TABS) 100 MG tablet Take 1 tablet (100 mg total) by mouth 2 (two) times daily. for 7 days 14 tablet 0     No current facility-administered medications for this visit.       Review of patient's allergies indicates:  No Known Allergies    Objective:     Physical Exam   Constitutional:  Non-toxic appearance. He does not appear ill. No distress.   Eyes: Conjunctivae are normal. Right eye exhibits no discharge. Left eye exhibits no discharge.   Pulmonary/Chest: Effort normal. No respiratory distress.   Abdominal: Normal appearance.   Genitourinary: no penile rash and no pubic lice. No phimosis, paraphimosis, penile erythema, penile tenderness or penile swelling. No discharge found.         Neurological: He is alert.   Skin: Skin is warm, dry, not diaphoretic, not pale and no rash. No erythema   Psychiatric: His behavior is normal. Mood normal.   Nursing note and vitals reviewed.chaperone present         Assessment:     1. Folliculitis        Plan:       Folliculitis  -     doxycycline (VIBRA-TABS) 100 MG tablet; Take 1  tablet (100 mg total) by mouth 2 (two) times daily. for 7 days  Dispense: 14 tablet; Refill: 0    I have reviewed the patient chart and pertinent past imaging/labs.  Patient declines any possibility of HSV states that this is from shaving    Patient Instructions   Take antibiotic as prescribed with food no milk products.  You are more prone to sunburns while on this antibiotic please avoid the sun.  Wash area with warm soap and water do not use any topical alcohol or hydrogen peroxide or Neosporin.  Use Vaseline as needed to help reduce friction and irritation.  Follow up if symptoms do not improve in 2-3 days

## 2024-11-07 NOTE — PATIENT INSTRUCTIONS
Take antibiotic as prescribed with food no milk products.  You are more prone to sunburns while on this antibiotic please avoid the sun.  Wash area with warm soap and water do not use any topical alcohol or hydrogen peroxide or Neosporin.  Use Vaseline as needed to help reduce friction and irritation.  Follow up if symptoms do not improve in 2-3 days

## 2025-01-07 ENCOUNTER — OFFICE VISIT (OUTPATIENT)
Dept: PSYCHIATRY | Facility: CLINIC | Age: 45
End: 2025-01-07
Payer: COMMERCIAL

## 2025-01-07 DIAGNOSIS — F43.23 ADJUSTMENT DISORDER WITH MIXED ANXIETY AND DEPRESSED MOOD: ICD-10-CM

## 2025-01-07 PROCEDURE — 90791 PSYCH DIAGNOSTIC EVALUATION: CPT | Mod: S$GLB,,, | Performed by: CASE MANAGER/CARE COORDINATOR

## 2025-01-07 PROCEDURE — 99999 PR PBB SHADOW E&M-EST. PATIENT-LVL I: CPT | Mod: PBBFAC,,, | Performed by: CASE MANAGER/CARE COORDINATOR

## 2025-01-07 NOTE — PROGRESS NOTES
"Psychiatry Initial Visit (PhD/LCSW)  Diagnostic Interview - CPT 15506    Date: 1/7/2025    Site: [facility]    Referral source: Self-referral     Clinical status of patient: Outpatient    Eben Lagunas, a 44 y.o. male, for initial evaluation visit.  Met with patient.    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  He reports amotivation, low mood, insomnia, fatigue, low self esteem, poor appetite, anxiety, excessive worry, restlessness, and irritability over the last 2-3 years that have worsened in the last year. He reports hx of panic attacks.  Pt works an average of 62 hours in a 5 day workweek with some weekend work as well.  Has initial insomnia and averages around 5 hours of sleep a night.  He reports he is no longer happy at work and feels his boss is not as supportive as he used to be.  Pt has complicated family dynamics with his ex-wife and mother of one of his children leading to conflict with his no ex-girlfriend.  He was dating his ex, Lissy, for 4 years and they began co-habitating last year before breaking up.  Pt returned to live in a family house.  He says he generally avoids conflict and talking about his feelings; tends to be more passive when confronted and "shuts down."  He recently had a "heated conversation" with a coworker which is out of character for him.  Pt says "I've never felt like my opinions matter" across his lifespan.  His primary goal for tx is to learn to effectively communicate with his family and coworkers.          1/7/2025     4:39 PM 10/24/2024    11:42 AM   Depression Patient Health Questionnaire   Over the last two weeks how often have you been bothered by little interest or pleasure in " doing things More than half the days Several days   Over the last two weeks how often have you been bothered by feeling down, depressed or hopeless More than half the days More than half the days   PHQ-2 Total Score 4 3   Over the last two weeks how often have you been bothered by trouble falling or staying asleep, or sleeping too much Several days Nearly every day   Over the last two weeks how often have you been bothered by feeling tired or having little energy More than half the days Several days   Over the last two weeks how often have you been bothered by a poor appetite or overeating Several days Nearly every day   Over the last two weeks how often have you been bothered by feeling bad about yourself - or that you are a failure or have let yourself or your family down Nearly every day More than half the days   Over the last two weeks how often have you been bothered by trouble concentrating on things, such as reading the newspaper or watching television Not at all Several days   Over the last two weeks how often have you been bothered by moving or speaking so slowly that other people could have noticed. Or the opposite - being so fidgety or restless that you have been moving around a lot more than usual. Several days Not at all   Over the last two weeks how often have you been bothered by thoughts that you would be better off dead, or of hurting yourself Not at all Several days   If you checked off any problems, how difficult have these problems made it for you to do your work, take care of things at home or get along with other people? Somewhat difficult Very difficult   PHQ-9 Score 12 14   PHQ-9 Interpretation Moderate Moderate            1/7/2025     4:40 PM   JESSICA-7   Was test performed? Yes   1. Feeling nervous, anxious, or on edge? Several days   2. Not being able to stop or control worrying? More than half the days   3. Worrying too much about different things? Several days   4. Trouble relaxing? More  "than half the days   5. Being so restless that it is hard to sit still? Several days   6. Becoming easily annoyed or irritable? Several days   7. Feeling afraid as if something awful might happen? Not at all   JESSICA-7 Score 8   Number answered (out of first 7) 7   Interpretation Mild Anxiety        Pain: noncontributory    Symptoms:   Mood: depressed mood, diminished interest, insomnia, hypersomnia, worthlessness/guilt, and social isolation  Anxiety: excessive anxiety/worry, restlessness/keyed up, and irritability  Substance abuse: denied  Cognitive functioning: denied  Health behaviors: noncontributory    Psychiatric history: none    Medical history: Pt reports hx of "mini stroke" following a panic attack.     Family history of psychiatric illness: not known    Social history (marriage, employment, etc.): Pt is a [place] native. He is currently  and single. Has 4 children (10, 12, and two in college) with two women. 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     Substance use:   Alcohol:  1-2 drinks every other day    Drugs: none   Tobacco: 1 pack cigarettes a day    Caffeine: Soda daily; coffee and energy drinks a few times a week     Current medications and drug reactions (include OTC, herbal): see medication list     Strengths and liabilities: Strength: Patient accepts guidance/feedback, Strength: Patient is expressive/articulate., Strength: Patient is intelligent., Strength: Patient is motivated for change., Strength: Patient is physically healthy., Strength: Patient has positive support network., Strength: Patient has reasonable judgment., Strength: Patient is stable.    Current Evaluation:     Mental Status Exam:  General Appearance:  unremarkable, age appropriate   Speech: normal tone, normal rate, normal pitch, normal volume      Level of Cooperation: cooperative      Thought Processes: normal and logical   Mood: steady      Thought Content: " normal, no suicidality, no homicidality, delusions, or paranoia   Affect: congruent and appropriate   Orientation: Oriented x3   Memory: recent >  intact   Attention Span & Concentration: intact   Fund of General Knowledge: intact and appropriate to age and level of education   Abstract Reasoning: interpretation of similarities was abstract   Judgment & Insight: good     Language  intact     Diagnostic Impression - Plan:       ICD-10-CM ICD-9-CM   1. Adjustment disorder with mixed anxiety and depressed mood  F43.23 309.28       Plan:individual psychotherapy    Return to Clinic: 1 month    Length of Service (minutes): 60

## 2025-02-06 ENCOUNTER — OFFICE VISIT (OUTPATIENT)
Dept: PSYCHIATRY | Facility: CLINIC | Age: 45
End: 2025-02-06
Payer: COMMERCIAL

## 2025-02-06 DIAGNOSIS — F33.1 MODERATE EPISODE OF RECURRENT MAJOR DEPRESSIVE DISORDER: Primary | ICD-10-CM

## 2025-02-06 PROCEDURE — 90837 PSYTX W PT 60 MINUTES: CPT | Mod: S$GLB,,, | Performed by: CASE MANAGER/CARE COORDINATOR

## 2025-02-06 PROCEDURE — 3044F HG A1C LEVEL LT 7.0%: CPT | Mod: CPTII,S$GLB,, | Performed by: CASE MANAGER/CARE COORDINATOR

## 2025-02-06 PROCEDURE — 99999 PR PBB SHADOW E&M-EST. PATIENT-LVL I: CPT | Mod: PBBFAC,,, | Performed by: CASE MANAGER/CARE COORDINATOR

## 2025-02-06 NOTE — PROGRESS NOTES
"Individual Psychotherapy (PhD/LCSW)    2/6/2025    Site:  [facility]         Therapeutic Intervention: Met with patient and mother.  Outpatient - Insight oriented psychotherapy 60 min - CPT code 89795, Outpatient - Behavior modifying psychotherapy 60 min - CPT code 40813, Outpatient - Supportive psychotherapy 60 min - CPT Code 08737, and Outpatient - Interactive psychotherapy 60 min - CPT code 59840    Chief complaint/reason for encounter: depression, anxiety, and interpersonal     Interval history and content of current session: Pt presents for first f/u session.  Pt reports he has been feeling "worse" and c/o poor focus and concentration affecting his work, relationships, and home.  He suspects ADHD based and consulted with his PCP who prescribed Bupropion.  Pt reports taking for about 3 weeks and discontinuing because he did not find it effective.  Discussed impact of stress, anxiety, and depression on executive functioning and the importance of treating these in assessing for ADHD.  Pt describes his work-life balance as "non-existent" and rates his stress 9.5/10.  Pt says he feels like he was "at rock bottom" last week and describes it like "the sunken place" from the movie Get Out.  Pt reports working excessively to distract himself and avoids going home when he doesn't have his daughter because "it's lonely."  Reframed statement that "I'm no good to anyone right now" and discussed the impact of self-talk. Reviewed stress mgmt 101 and interventions.  Sent psychiatry referral for med mgmt at pt request.     Treatment plan:  Target symptoms: depression, distractability, lack of focus, anxiety , work stress  Why chosen therapy is appropriate versus another modality: relevant to diagnosis  Outcome monitoring methods: self-report, observation  Therapeutic intervention type: insight oriented psychotherapy, behavior modifying psychotherapy, supportive psychotherapy, interactive psychotherapy    Risk " parameters:  Patient reports no suicidal ideation  Patient reports no homicidal ideation  Patient reports no self-injurious behavior  Patient reports no violent behavior    Verbal deficits: None    Patient's response to intervention:  The patient's response to intervention is accepting.    Progress toward goals and other mental status changes:  The patient's progress toward goals is fair .    Diagnosis:     ICD-10-CM ICD-9-CM   1. Moderate episode of recurrent major depressive disorder  F33.1 296.32       Plan:  individual psychotherapy    Return to clinic: 1 month    Length of Service (minutes): 60

## 2025-03-06 ENCOUNTER — OFFICE VISIT (OUTPATIENT)
Dept: PSYCHIATRY | Facility: CLINIC | Age: 45
End: 2025-03-06
Payer: COMMERCIAL

## 2025-03-06 DIAGNOSIS — F33.1 MODERATE EPISODE OF RECURRENT MAJOR DEPRESSIVE DISORDER: Primary | ICD-10-CM

## 2025-03-06 PROCEDURE — 90837 PSYTX W PT 60 MINUTES: CPT | Mod: S$GLB,,, | Performed by: CASE MANAGER/CARE COORDINATOR

## 2025-03-06 PROCEDURE — 3044F HG A1C LEVEL LT 7.0%: CPT | Mod: CPTII,S$GLB,, | Performed by: CASE MANAGER/CARE COORDINATOR

## 2025-03-06 PROCEDURE — 99999 PR PBB SHADOW E&M-EST. PATIENT-LVL I: CPT | Mod: PBBFAC,,, | Performed by: CASE MANAGER/CARE COORDINATOR

## 2025-03-10 ENCOUNTER — OFFICE VISIT (OUTPATIENT)
Dept: PSYCHIATRY | Facility: CLINIC | Age: 45
End: 2025-03-10
Payer: COMMERCIAL

## 2025-03-10 VITALS
BODY MASS INDEX: 24.63 KG/M2 | DIASTOLIC BLOOD PRESSURE: 96 MMHG | WEIGHT: 191.94 LBS | HEART RATE: 82 BPM | SYSTOLIC BLOOD PRESSURE: 142 MMHG | HEIGHT: 74 IN

## 2025-03-10 DIAGNOSIS — F90.2 ATTENTION DEFICIT HYPERACTIVITY DISORDER (ADHD), COMBINED TYPE: ICD-10-CM

## 2025-03-10 DIAGNOSIS — F33.1 MODERATE EPISODE OF RECURRENT MAJOR DEPRESSIVE DISORDER: Primary | ICD-10-CM

## 2025-03-10 PROCEDURE — 3077F SYST BP >= 140 MM HG: CPT | Mod: CPTII,S$GLB,,

## 2025-03-10 PROCEDURE — 3044F HG A1C LEVEL LT 7.0%: CPT | Mod: CPTII,S$GLB,,

## 2025-03-10 PROCEDURE — 99999 PR PBB SHADOW E&M-EST. PATIENT-LVL III: CPT | Mod: PBBFAC,,,

## 2025-03-10 PROCEDURE — 90792 PSYCH DIAG EVAL W/MED SRVCS: CPT | Mod: S$GLB,,,

## 2025-03-10 PROCEDURE — 3080F DIAST BP >= 90 MM HG: CPT | Mod: CPTII,S$GLB,,

## 2025-03-10 RX ORDER — DEXTROAMPHETAMINE SACCHARATE, AMPHETAMINE ASPARTATE MONOHYDRATE, DEXTROAMPHETAMINE SULFATE AND AMPHETAMINE SULFATE 2.5; 2.5; 2.5; 2.5 MG/1; MG/1; MG/1; MG/1
10 CAPSULE, EXTENDED RELEASE ORAL EVERY MORNING
Qty: 30 CAPSULE | Refills: 0 | Status: SHIPPED | OUTPATIENT
Start: 2025-03-10

## 2025-03-10 NOTE — PROGRESS NOTES
"  Outpatient Psychiatry Initial Visit (MD/NP)    3/10/2025    Eben Lagunas, a 44 y.o. male, presenting for initial evaluation visit. Met with patient.    Reason for Encounter: self-referral. Patient complains of No chief complaint on file.  .    History of Present Illness: ADHD Adult:  Have difficulty sustaining attention in tasks or fun activities?  yes -   Don't follow through on instructions and fail to finish work?  yes -   Have difficulty organizing tasks and activities?  yes -   Avoid, dislike, or are reluctant to engage in work that requires sustained mental effort?  yes -   Easily distracted?  yes -   Forgetful in daily activities?  yes -   Fidget with hands or feet, or squirm in seat?  yes -   Have difficulty engaging in leisure activities or doing fun things quietly?  no  Feel "on the go" or "driven by a motor"?  no  Blurt out answers before questions have been completed?  yes -   Have difficulty waiting your turn, are impatient?  yes -   Interrupt or intrude on others?  yes -     Anxiety  Patient is here for evaluation of anxiety.  He has the following anxiety symptoms: dizziness, irritable. Onset of symptoms was approximately several months ago.  Symptoms have been unchanged since that time. He denies current suicidal and homicidal ideation. Family history significant for anxiety and depression.Possible organic causes contributing are: medications, endocrine/metabolic, neuro. Risk factors: previous episode of depression Previous treatment includes individual therapy.   He complains of the following medication side effects: none.      Depression  Patient complains of depression. He complains of depressed mood and difficulty concentrating. Onset was approximately several months ago. Symptoms have been unchanged since that time. Current symptoms include: depressed mood and difficulty concentrating. Patient denies recurrent thoughts of death, suicidal attempt, suicidal thoughts with specific plan, " "and suicidal thoughts without plan. Family history significant for no psychiatric illness. Possible organic causes contributing are: medications, endocrine/metabolic, neuro. Risk factors: previous episode of depression. Previous treatment includes individual therapy and medication. He complains of the following side effects from the treatment: none.    Patient with past medical history of   Past Medical History:   Diagnosis Date    Hypertension     Stroke 2023     And current issues of   Active Problem List with Overview Notes    Diagnosis Date Noted    Current moderate episode of major depressive disorder without prior episode 10/24/2024    Hypertension 10/24/2024    Chronic right shoulder pain 10/24/2024       Presents for evaluation of symptoms of inattention, concentration, which began at a young age, however patient has resisted evaluation as he wanted to just 'deal with it.' Patient endorses some ongoing depression and anxiety of late related to personal and marital issues, as well as not being able to see his children as much as he would like to. Patient states depression has significantly improved of late but problems with attention and concentration persist. He works as a restaurant director and requires significant focus. He is currently attending psychotherapy with aurora montalvo lcsw.     Patient endorses significant and long-standing problems related to symptoms of inattention and hyperactivity, and these symptoms are causing significant impairment and distress at both work and at home with family, eroding relationships with family members and difficulty maintaining consistent employment , workplace performance, and resultant loss of income    The following is an excerpt of patients last visit with kassie montalvo. I have included it for reference.     presents for first f/u session. Pt reports he has been feeling "worse" and c/o poor focus and concentration affecting his work, relationships, and home. He " "suspects ADHD based and consulted with his PCP who prescribed Bupropion. Pt reports taking for about 3 weeks and discontinuing because he did not find it effective. Discussed impact of stress, anxiety, and depression on executive functioning and the importance of treating these in assessing for ADHD. Pt describes his work-life balance as "non-existent" and rates his stress 9.5/10. Pt says he feels like he was "at rock bottom" last week and describes it like "the sunken place" from the movie Get Out. Pt reports working excessively to distract himself and avoids going home when he doesn't have his daughter because "it's lonely." Reframed statement that "I'm no good to anyone right now" and discussed the impact of self-talk. Reviewed stress mgmt 101 and interventions. Sent psychiatry referral for med mgmt at pt request.     He suspects ADHD based and consulted with his PCP who prescribed Bupropion. Pt reports taking for about 3 weeks and discontinuing because he did not find it effective.         CURRENT PSYCHOTROPIC REGIMEN:  Wellbutrin, self discontinued due to lack of efficacy, states it was only slightly helpful    Discussed that any stimulant use will remain at a low dose due to patient's cardiac and neurological history. He understands, will trial medication and titrate carefully. Patient is advised to discontinue medication immediately on experiencing adverse effects.       Compliance: yes   reviewed without concern yes  Side effects: no  Patient's overall opinion of current regimen: Worked somewhat   Life event tracker/ stressors/ relationships:         PSYCHIATRIC ROS:  Depressed mood: yes   Sleep Disturbance:  yes   interest/pleasure/anhedonia: no  Negative-self talk /guilty/hopelessness: no  energy/anergy: no  concentration: no  Appetite disturbance: yes  Self-injurious /risky behavior: no  Psychomotor disturbance: no  Suicidal Ideation:  no  Chronic daily anxiety/panic: no  Denies: severe panic " associated with chest pain, palpitations, hyperventilation, sense of doom, diaphoresis.   Obsessions/Recurrent thoughts:  denies  Compulsions/ Recurrent behaviors:  denies     RISK PARAMETERS:  Patient reports no suicidal ideation  Patient reports no homicidal ideation  Patient reports no self-injurious behavior  Patient reports no violent behavior       SOCIAL HISTORY:  Lives in own home - house  Work - Enjoys work,   Relationship - going to couples therapy with ex wife   Children - 4 children   Druze - no     - no      PAST PSYCHIATRIC HISTORY:  Family Psychiatric History:  Denies family history of bipolar , schizophrenia ,, psychiatric hospitalizations , suicide attempts of completions .  Previous Psychiatric Care: no  Previous Psychiatric Hospitalizations: no;  Previous Suicide Attempts/ Non-suicidal self injury: no  Previous Medication Trials: no         LEGAL, VIOLENCE:  History of Violence: no  Legal history: no  DWI / DUI: no  Access to Gun: no        SUBSTANCE ABUSE HISTORY:  Denies       NEUROLOGIC HISTORY:  Seizures: no - Patient has HX of stroke 2023 **  Head trauma: no        Review Of Systems:     GENERAL:  No weight gain or loss  SKIN:  No rashes or lacerations  HEAD:  No headaches  EYES:  No exophthalmos, jaundice or blindness  EARS:  No dizziness, tinnitus or hearing loss  NOSE:  No changes in smell  MOUTH & THROAT:  No dyskinetic movements or obvious goiter  CHEST:  No shortness of breath, hyperventilation or cough  CARDIOVASCULAR:  No tachycardia or chest pain  ABDOMEN:  No nausea, vomiting, pain, constipation or diarrhea  URINARY:  No frequency, dysuria or sexual dysfunction  ENDOCRINE:  No polydipsia, polyuria  MUSCULOSKELETAL:  No pain or stiffness of the joints  NEUROLOGIC:  No weakness, sensory changes, seizures, confusion, memory loss, tremor or other abnormal movements    Current Evaluation:     Nutritional Screening: Considering the patient's height and weight, medications,  medical history and preferences, should a referral be made to the dietitian? no    Constitutional  Vitals:  Most recent vital signs, dated less than 90 days prior to this appointment, were reviewed.    Vitals:    03/10/25 1329   BP: (!) 142/96   Pulse: 82          General:  unremarkable, age appropriate     Musculoskeletal  Muscle Strength/Tone:  no tremor, no tic   Gait & Station:  non-ataxic     Psychiatric  Speech:  no latency; no press   Mood & Affect:  steady, euthymic  congruent and appropriate   Thought Process:  normal and logical   Associations:  intact   Thought Content:  normal, no suicidality, no homicidality, delusions, or paranoia   Insight:  intact   Judgement: behavior is adequate to circumstances   Orientation:  grossly intact   Memory: intact for content of interview   Language: grossly intact   Attention Span & Concentration:  able to focus   Fund of Knowledge:  intact and appropriate to age and level of education       Relevant Elements of Neurological Exam: normal gait    Functioning in Relationships:  Spouse/partner: currently working on relationship with ex-wife  Peers: has some peer relationships  Employers: feels well treated by employer         Laboratory Data  No visits with results within 1 Month(s) from this visit.   Latest known visit with results is:   Admission on 10/08/2024, Discharged on 10/08/2024   Component Date Value Ref Range Status    WBC 10/08/2024 9.48  3.90 - 12.70 K/uL Final    RBC 10/08/2024 5.09  4.60 - 6.20 M/uL Final    Hemoglobin 10/08/2024 15.8  14.0 - 18.0 g/dL Final    Hematocrit 10/08/2024 47.2  40.0 - 54.0 % Final    MCV 10/08/2024 93  82 - 98 fL Final    MCH 10/08/2024 31.0  27.0 - 31.0 pg Final    MCHC 10/08/2024 33.5  32.0 - 36.0 g/dL Final    RDW 10/08/2024 13.7  11.5 - 14.5 % Final    Platelets 10/08/2024 193  150 - 450 K/uL Final    MPV 10/08/2024 11.0  9.2 - 12.9 fL Final    Immature Granulocytes 10/08/2024 0.3  0.0 - 0.5 % Final    Gran # (ANC) 10/08/2024  5.9  1.8 - 7.7 K/uL Final    Immature Grans (Abs) 10/08/2024 0.03  0.00 - 0.04 K/uL Final    Lymph # 10/08/2024 2.7  1.0 - 4.8 K/uL Final    Mono # 10/08/2024 0.8  0.3 - 1.0 K/uL Final    Eos # 10/08/2024 0.1  0.0 - 0.5 K/uL Final    Baso # 10/08/2024 0.02  0.00 - 0.20 K/uL Final    nRBC 10/08/2024 0  0 /100 WBC Final    Gran % 10/08/2024 62.1  38.0 - 73.0 % Final    Lymph % 10/08/2024 28.6  18.0 - 48.0 % Final    Mono % 10/08/2024 8.0  4.0 - 15.0 % Final    Eosinophil % 10/08/2024 0.8  0.0 - 8.0 % Final    Basophil % 10/08/2024 0.2  0.0 - 1.9 % Final    Differential Method 10/08/2024 Automated   Final    Sodium 10/08/2024 139  136 - 145 mmol/L Final    Potassium 10/08/2024 3.9  3.5 - 5.1 mmol/L Final    Chloride 10/08/2024 105  95 - 110 mmol/L Final    CO2 10/08/2024 26  23 - 29 mmol/L Final    Glucose 10/08/2024 96  70 - 110 mg/dL Final    BUN 10/08/2024 12  6 - 20 mg/dL Final    Creatinine 10/08/2024 1.3  0.5 - 1.4 mg/dL Final    Calcium 10/08/2024 9.9  8.7 - 10.5 mg/dL Final    Total Protein 10/08/2024 7.3  6.0 - 8.4 g/dL Final    Albumin 10/08/2024 4.2  3.5 - 5.2 g/dL Final    Total Bilirubin 10/08/2024 0.9  0.1 - 1.0 mg/dL Final    Alkaline Phosphatase 10/08/2024 123  55 - 135 U/L Final    AST 10/08/2024 15  10 - 40 U/L Final    ALT 10/08/2024 24  10 - 44 U/L Final    eGFR 10/08/2024 >60.0  >60 mL/min/1.73 m^2 Final    Anion Gap 10/08/2024 8  8 - 16 mmol/L Final    Lipase 10/08/2024 16  4 - 60 U/L Final    Specimen UA 10/08/2024 Urine, Clean Catch   Final    Color, UA 10/08/2024 Yellow  Yellow, Straw, [place] Final    Appearance, UA 10/08/2024 Clear  Clear Final    pH, UA 10/08/2024 7.0  5.0 - 8.0 Final    Specific Gravity, UA 10/08/2024 1.030  1.005 - 1.030 Final    Protein, UA 10/08/2024 Trace (A)  Negative Final    Glucose, UA 10/08/2024 Negative  Negative Final    Ketones, UA 10/08/2024 Negative  Negative Final    Bilirubin (UA) 10/08/2024 Negative  Negative Final    Occult Blood UA 10/08/2024 Negative   Negative Final    Nitrite, UA 10/08/2024 Negative  Negative Final    Leukocytes, UA 10/08/2024 Negative  Negative Final    POC Glucose 10/08/2024 98  70 - 110 mg/dL Final    POC BUN 10/08/2024 13  6 - 30 mg/dL Final    POC Creatinine 10/08/2024 1.4  0.5 - 1.4 mg/dL Final    POC Sodium 10/08/2024 140  136 - 145 mmol/L Final    POC Potassium 10/08/2024 4.1  3.5 - 5.1 mmol/L Final    POC Chloride 10/08/2024 100  95 - 110 mmol/L Final    POC TCO2 (MEASURED) 10/08/2024 27  23 - 29 mmol/L Final    POC Ionized Calcium 10/08/2024 1.21  1.06 - 1.42 mmol/L Final    POC Hematocrit 10/08/2024 48  36 - 54 %PCV Final    Sample 10/08/2024 KEVIN   Final         Medications  Encounter Medications[1]        Assessment - Diagnosis - Goals:     Impression:   1. Moderate episode of recurrent major depressive disorder  Ambulatory referral/consult to Behavioral Health    dextroamphetamine-amphetamine (ADDERALL XR) 10 MG 24 hr capsule      2. Attention deficit hyperactivity disorder (ADHD), combined type              Strengths and Liabilities: Strength: Patient accepts guidance/feedback, Strength: Patient is expressive/articulate., Strength: Patient is intelligent., Liability: Patient lacks coping skills.    Treatment Goals:  Specify outcomes written in observable, behavioral terms:   Anxiety: acquiring relapse prevention skills, reducing negative automatic thoughts, and reducing physical symptoms of anxiety  Depression: increasing energy, increasing interest in usual activities, and increasing motivation  ADHD: complete work/school assignments on time, stay on task without frequent reminders, less frequent interruptions of others, remain focused during conversations with others as evidenced by self-report and observation        Treatment Plan/Recommendations:   Medication Management: Continue current medications. The risks and benefits of medication were discussed with the patient.  The treatment plan and follow up plan were reviewed with  the patient.  Medication use will be careful and sparing with low dose to achieve maximum benefit for minimum side effects. Discussed with patient potential neurological and cardiac issues that stimulant medication may exacerbate, he is amenable to trialing at a very low dose.     Return to Clinic: 1 month, as scheduled, as needed      Labs: reviewed most recent. The treatment plan and follow up plan were reviewed with the patient. Discussed with patient informed consent, risks vs. benefits, alternative treatments, side effect profile and the inherent unpredictability of individual responses to these treatments. The patient expresses understanding of the above and displays the capacity to agree with this current plan and had no other questions. Encouraged Patient to keep future appointments. Take medications as prescribed and abstain from substance abuse. In the event of an emergency patient was advised to go to the emergency room.    Total time: I spent a total of 63 minutes on the day of the visit.  This includes face to face time and non-face to face time preparing to see the patient (eg, review of tests), obtaining and/or reviewing separately obtained history, documenting clinical information in the electronic or other health record, independently interpreting results and communicating results to the patient/family/caregiver, or care coordinator.    Consulting clinician was informed of the encounter and consult note.         [1]   Outpatient Encounter Medications as of 3/10/2025   Medication Sig Dispense Refill    amLODIPine (NORVASC) 5 MG tablet Take 1 tablet (5 mg total) by mouth once daily. 30 tablet 2    cyclobenzaprine (FLEXERIL) 5 MG tablet One tablet t.i.d. p.r.n. for back pain and muscle spasm 30 tablet 0    diclofenac sodium (VOLTAREN ARTHRITIS PAIN) 1 % Gel Apply 2 g topically 4 (four) times daily. 450 g 1    ibuprofen (ADVIL,MOTRIN) 600 MG tablet Take 1 tablet (600 mg total) by mouth 3 (three) times  daily as needed for Pain. 30 tablet 0    pantoprazole (PROTONIX) 40 MG tablet Take 1 tablet (40 mg total) by mouth once daily. 30 tablet 3    promethazine (PHENERGAN) 12.5 MG Tab Take 1 tablet (12.5 mg total) by mouth daily as needed. 20 tablet 0     No facility-administered encounter medications on file as of 3/10/2025.

## 2025-03-12 PROBLEM — F33.1 MODERATE EPISODE OF RECURRENT MAJOR DEPRESSIVE DISORDER: Status: ACTIVE | Noted: 2024-10-24

## 2025-03-12 NOTE — PROGRESS NOTES
"Individual Psychotherapy (PhD/LCSW)    3/6/2025    Site:  [facility]         Therapeutic Intervention: Met with patient and mother.  Outpatient - Insight oriented psychotherapy 60 min - CPT code 31038, Outpatient - Behavior modifying psychotherapy 60 min - CPT code 82956, Outpatient - Supportive psychotherapy 60 min - CPT Code 34012, and Outpatient - Interactive psychotherapy 60 min - CPT code 99544    Chief complaint/reason for encounter: depression, anxiety, and interpersonal     Interval history and content of current session: Pt presents for f/u session. Pt reports he has been feeling "pretty good."  He took two mental health days and his boss has been more patient and supportive which "significantly reduced job stress."  Pt reports his home life and relationship with ex remain "stressful."  He says they are "on and off" and in "limbo."  Continue to argue over the same issues. Explored unresolved issues and feelings of both parties and discussed interpersonal communication and couples' counseling.  Pt reports reviewing Stress Mgmt manual and finding deep breathing and mountain meditation/visualization helpful with stress and sleep.      Treatment plan:  Target symptoms: depression, distractability, lack of focus, anxiety , work stress  Why chosen therapy is appropriate versus another modality: relevant to diagnosis  Outcome monitoring methods: self-report, observation  Therapeutic intervention type: insight oriented psychotherapy, behavior modifying psychotherapy, supportive psychotherapy, interactive psychotherapy    Risk parameters:  Patient reports no suicidal ideation  Patient reports no homicidal ideation  Patient reports no self-injurious behavior  Patient reports no violent behavior    Verbal deficits: None    Patient's response to intervention:  The patient's response to intervention is accepting.    Progress toward goals and other mental status changes:  The patient's progress toward goals is " good.    Diagnosis:     ICD-10-CM ICD-9-CM   1. Moderate episode of recurrent major depressive disorder  F33.1 296.32       Plan:  individual psychotherapy    Return to clinic: 1 month    Length of Service (minutes): 60

## 2025-03-27 DIAGNOSIS — F33.1 MODERATE EPISODE OF RECURRENT MAJOR DEPRESSIVE DISORDER: ICD-10-CM

## 2025-03-27 RX ORDER — DEXTROAMPHETAMINE SACCHARATE, AMPHETAMINE ASPARTATE MONOHYDRATE, DEXTROAMPHETAMINE SULFATE AND AMPHETAMINE SULFATE 2.5; 2.5; 2.5; 2.5 MG/1; MG/1; MG/1; MG/1
10 CAPSULE, EXTENDED RELEASE ORAL EVERY MORNING
Qty: 30 CAPSULE | Refills: 0 | Status: SHIPPED | OUTPATIENT
Start: 2025-03-27

## 2025-04-08 ENCOUNTER — OFFICE VISIT (OUTPATIENT)
Dept: PSYCHIATRY | Facility: CLINIC | Age: 45
End: 2025-04-08
Payer: COMMERCIAL

## 2025-04-08 DIAGNOSIS — F33.1 MODERATE EPISODE OF RECURRENT MAJOR DEPRESSIVE DISORDER: Primary | ICD-10-CM

## 2025-04-08 PROCEDURE — 99999 PR PBB SHADOW E&M-EST. PATIENT-LVL I: CPT | Mod: PBBFAC,,, | Performed by: CASE MANAGER/CARE COORDINATOR

## 2025-04-08 PROCEDURE — 3044F HG A1C LEVEL LT 7.0%: CPT | Mod: CPTII,S$GLB,, | Performed by: CASE MANAGER/CARE COORDINATOR

## 2025-04-08 PROCEDURE — 90837 PSYTX W PT 60 MINUTES: CPT | Mod: S$GLB,,, | Performed by: CASE MANAGER/CARE COORDINATOR

## 2025-04-14 ENCOUNTER — HOSPITAL ENCOUNTER (EMERGENCY)
Facility: HOSPITAL | Age: 45
Discharge: HOME | End: 2025-04-14
Attending: EMERGENCY MEDICINE
Payer: COMMERCIAL

## 2025-04-14 VITALS
HEIGHT: 74 IN | OXYGEN SATURATION: 99 % | SYSTOLIC BLOOD PRESSURE: 138 MMHG | DIASTOLIC BLOOD PRESSURE: 81 MMHG | WEIGHT: 194 LBS | RESPIRATION RATE: 16 BRPM | HEART RATE: 70 BPM | BODY MASS INDEX: 24.9 KG/M2 | TEMPERATURE: 99 F

## 2025-04-14 DIAGNOSIS — R10.13 EPIGASTRIC PAIN: Primary | ICD-10-CM

## 2025-04-14 DIAGNOSIS — R11.2 NAUSEA AND VOMITING, UNSPECIFIED VOMITING TYPE: ICD-10-CM

## 2025-04-14 DIAGNOSIS — K21.9 GASTROESOPHAGEAL REFLUX DISEASE, UNSPECIFIED WHETHER ESOPHAGITIS PRESENT: ICD-10-CM

## 2025-04-14 LAB
ABSOLUTE EOSINOPHIL (OHS): 0.1 K/UL
ABSOLUTE MONOCYTE (OHS): 0.57 K/UL (ref 0.3–1)
ABSOLUTE NEUTROPHIL COUNT (OHS): 5.34 K/UL (ref 1.8–7.7)
ALBUMIN SERPL BCP-MCNC: 4.2 G/DL (ref 3.5–5.2)
ALP SERPL-CCNC: 141 UNIT/L (ref 40–150)
ALT SERPL W/O P-5'-P-CCNC: 50 UNIT/L (ref 10–44)
ANION GAP (OHS): 10 MMOL/L (ref 8–16)
AST SERPL-CCNC: 20 UNIT/L (ref 11–45)
BASOPHILS # BLD AUTO: 0.02 K/UL
BASOPHILS NFR BLD AUTO: 0.2 %
BILIRUB SERPL-MCNC: 0.7 MG/DL (ref 0.1–1)
BILIRUB UR QL STRIP.AUTO: NEGATIVE
BNP SERPL-MCNC: <10 PG/ML (ref 0–99)
BUN SERPL-MCNC: 13 MG/DL (ref 6–20)
BUN SERPL-MCNC: 13 MG/DL (ref 6–30)
CALCIUM SERPL-MCNC: 9.8 MG/DL (ref 8.7–10.5)
CHLORIDE SERPL-SCNC: 103 MMOL/L (ref 95–110)
CHLORIDE SERPL-SCNC: 105 MMOL/L (ref 95–110)
CLARITY UR: CLEAR
CO2 SERPL-SCNC: 27 MMOL/L (ref 23–29)
COLOR UR AUTO: YELLOW
CREAT SERPL-MCNC: 1.2 MG/DL (ref 0.5–1.4)
CREAT SERPL-MCNC: 1.4 MG/DL (ref 0.5–1.4)
ERYTHROCYTE [DISTWIDTH] IN BLOOD BY AUTOMATED COUNT: 13.3 % (ref 11.5–14.5)
GFR SERPLBLD CREATININE-BSD FMLA CKD-EPI: >60 ML/MIN/1.73/M2
GLUCOSE SERPL-MCNC: 101 MG/DL (ref 70–110)
GLUCOSE SERPL-MCNC: 105 MG/DL (ref 70–110)
GLUCOSE UR QL STRIP: NEGATIVE
HCT VFR BLD AUTO: 48.8 % (ref 40–54)
HCT VFR BLD CALC: 49 %PCV (ref 36–54)
HCV AB SERPL QL IA: NORMAL
HGB BLD-MCNC: 15.6 GM/DL (ref 14–18)
HGB UR QL STRIP: NEGATIVE
HIV 1+2 AB+HIV1 P24 AG SERPL QL IA: NORMAL
IMM GRANULOCYTES # BLD AUTO: 0.03 K/UL (ref 0–0.04)
IMM GRANULOCYTES NFR BLD AUTO: 0.3 % (ref 0–0.5)
KETONES UR QL STRIP: NEGATIVE
LEUKOCYTE ESTERASE UR QL STRIP: NEGATIVE
LIPASE SERPL-CCNC: 21 U/L (ref 4–60)
LYMPHOCYTES # BLD AUTO: 2.62 K/UL (ref 1–4.8)
MCH RBC QN AUTO: 29.4 PG (ref 27–31)
MCHC RBC AUTO-ENTMCNC: 32 G/DL (ref 32–36)
MCV RBC AUTO: 92 FL (ref 82–98)
NITRITE UR QL STRIP: NEGATIVE
NUCLEATED RBC (/100WBC) (OHS): 0 /100 WBC
OHS QRS DURATION: 96 MS
OHS QTC CALCULATION: 376 MS
PH UR STRIP: 6 [PH]
PLATELET # BLD AUTO: 219 K/UL (ref 150–450)
PMV BLD AUTO: 11 FL (ref 9.2–12.9)
POC IONIZED CALCIUM: 1.11 MMOL/L (ref 1.06–1.42)
POC TCO2 (MEASURED): 28 MMOL/L (ref 23–29)
POTASSIUM BLD-SCNC: 4.3 MMOL/L (ref 3.5–5.1)
POTASSIUM SERPL-SCNC: 4.4 MMOL/L (ref 3.5–5.1)
PROT SERPL-MCNC: 7.7 GM/DL (ref 6–8.4)
PROT UR QL STRIP: ABNORMAL
RBC # BLD AUTO: 5.3 M/UL (ref 4.6–6.2)
RELATIVE EOSINOPHIL (OHS): 1.2 %
RELATIVE LYMPHOCYTE (OHS): 30.2 % (ref 18–48)
RELATIVE MONOCYTE (OHS): 6.6 % (ref 4–15)
RELATIVE NEUTROPHIL (OHS): 61.5 % (ref 38–73)
SAMPLE: NORMAL
SODIUM BLD-SCNC: 141 MMOL/L (ref 136–145)
SODIUM SERPL-SCNC: 142 MMOL/L (ref 136–145)
SP GR UR STRIP: 1.02
TROPONIN I SERPL HS-MCNC: <3 NG/L
UROBILINOGEN UR STRIP-ACNC: ABNORMAL EU/DL
WBC # BLD AUTO: 8.68 K/UL (ref 3.9–12.7)

## 2025-04-14 PROCEDURE — 93010 ELECTROCARDIOGRAM REPORT: CPT | Mod: ,,, | Performed by: INTERNAL MEDICINE

## 2025-04-14 PROCEDURE — 83690 ASSAY OF LIPASE: CPT | Performed by: EMERGENCY MEDICINE

## 2025-04-14 PROCEDURE — 96374 THER/PROPH/DIAG INJ IV PUSH: CPT

## 2025-04-14 PROCEDURE — 80047 BASIC METABLC PNL IONIZED CA: CPT

## 2025-04-14 PROCEDURE — 82330 ASSAY OF CALCIUM: CPT | Mod: 59

## 2025-04-14 PROCEDURE — 93005 ELECTROCARDIOGRAM TRACING: CPT

## 2025-04-14 PROCEDURE — 83880 ASSAY OF NATRIURETIC PEPTIDE: CPT | Performed by: PHYSICIAN ASSISTANT

## 2025-04-14 PROCEDURE — 99285 EMERGENCY DEPT VISIT HI MDM: CPT | Mod: 25

## 2025-04-14 PROCEDURE — 81003 URINALYSIS AUTO W/O SCOPE: CPT | Performed by: EMERGENCY MEDICINE

## 2025-04-14 PROCEDURE — 84484 ASSAY OF TROPONIN QUANT: CPT | Performed by: PHYSICIAN ASSISTANT

## 2025-04-14 PROCEDURE — 25000003 PHARM REV CODE 250: Performed by: PHYSICIAN ASSISTANT

## 2025-04-14 PROCEDURE — 25500020 PHARM REV CODE 255: Performed by: EMERGENCY MEDICINE

## 2025-04-14 PROCEDURE — 85025 COMPLETE CBC W/AUTO DIFF WBC: CPT | Performed by: EMERGENCY MEDICINE

## 2025-04-14 PROCEDURE — 87389 HIV-1 AG W/HIV-1&-2 AB AG IA: CPT | Performed by: PHYSICIAN ASSISTANT

## 2025-04-14 PROCEDURE — 86803 HEPATITIS C AB TEST: CPT | Performed by: PHYSICIAN ASSISTANT

## 2025-04-14 PROCEDURE — 96361 HYDRATE IV INFUSION ADD-ON: CPT

## 2025-04-14 PROCEDURE — 63600175 PHARM REV CODE 636 W HCPCS: Performed by: PHYSICIAN ASSISTANT

## 2025-04-14 PROCEDURE — 84075 ASSAY ALKALINE PHOSPHATASE: CPT | Performed by: EMERGENCY MEDICINE

## 2025-04-14 RX ORDER — LIDOCAINE HYDROCHLORIDE 20 MG/ML
5 SOLUTION OROPHARYNGEAL
Status: COMPLETED | OUTPATIENT
Start: 2025-04-14 | End: 2025-04-14

## 2025-04-14 RX ORDER — ALUMINUM HYDROXIDE, MAGNESIUM HYDROXIDE, AND SIMETHICONE 1200; 120; 1200 MG/30ML; MG/30ML; MG/30ML
15 SUSPENSION ORAL
Status: COMPLETED | OUTPATIENT
Start: 2025-04-14 | End: 2025-04-14

## 2025-04-14 RX ORDER — ONDANSETRON 4 MG/1
4 TABLET, ORALLY DISINTEGRATING ORAL EVERY 6 HOURS PRN
Qty: 15 TABLET | Refills: 0 | Status: SHIPPED | OUTPATIENT
Start: 2025-04-14

## 2025-04-14 RX ORDER — ONDANSETRON HYDROCHLORIDE 2 MG/ML
4 INJECTION, SOLUTION INTRAVENOUS
Status: COMPLETED | OUTPATIENT
Start: 2025-04-14 | End: 2025-04-14

## 2025-04-14 RX ORDER — SUCRALFATE 1 G/10ML
1 SUSPENSION ORAL 4 TIMES DAILY
Qty: 414 ML | Refills: 0 | Status: SHIPPED | OUTPATIENT
Start: 2025-04-14

## 2025-04-14 RX ORDER — PANTOPRAZOLE SODIUM 40 MG/1
40 TABLET, DELAYED RELEASE ORAL DAILY
Qty: 30 TABLET | Refills: 0 | Status: SHIPPED | OUTPATIENT
Start: 2025-04-14 | End: 2025-05-14

## 2025-04-14 RX ORDER — FAMOTIDINE 20 MG/1
20 TABLET, FILM COATED ORAL
Status: COMPLETED | OUTPATIENT
Start: 2025-04-14 | End: 2025-04-14

## 2025-04-14 RX ADMIN — ALUMINUM HYDROXIDE, MAGNESIUM HYDROXIDE, AND SIMETHICONE 15 ML: 200; 200; 20 SUSPENSION ORAL at 02:04

## 2025-04-14 RX ADMIN — LIDOCAINE HYDROCHLORIDE 5 ML: 20 SOLUTION ORAL at 02:04

## 2025-04-14 RX ADMIN — FAMOTIDINE 20 MG: 20 TABLET, FILM COATED ORAL at 02:04

## 2025-04-14 RX ADMIN — SODIUM CHLORIDE 1000 ML: 9 INJECTION, SOLUTION INTRAVENOUS at 02:04

## 2025-04-14 RX ADMIN — ONDANSETRON 4 MG: 2 INJECTION INTRAMUSCULAR; INTRAVENOUS at 02:04

## 2025-04-14 RX ADMIN — IOHEXOL 75 ML: 350 INJECTION, SOLUTION INTRAVENOUS at 04:04

## 2025-04-14 NOTE — ED NOTES
Patient comes into the emergency department by POV with complaints of N/V. Patient states that N/V since Saturday, pt reports he cannot keep food or fluids down. Pt also endorses chills, weakness, dizziness. Pt reports SOB and mid sternal CP that radiates to mid back. Patient denies active SOB, diarrhea, fevers, LOC, fall/injury.

## 2025-04-14 NOTE — DISCHARGE INSTRUCTIONS
Follow-up with gastroenterology for further evaluation of your abdominal pain  Continue your Protonix as directed for your pain. You may take the prescribed Carafate as needed for breakthrough pain.    You may take the prescribed Zofran as needed for nausea and vomiting.     Return to the emergency room for new, worsening, or concerning symptoms.     Future Appointments   Date Time Provider Department Center   4/28/2025 12:00 PM David Larsen NP [facility]   5/6/2025 10:00 AM Malik Rodrigues LCSW [facility]

## 2025-04-14 NOTE — PROVIDER PROGRESS NOTES - EMERGENCY DEPT.
Patient signed out to me by my colleague with instructions to follow-up pending work-up. Please see main ED note for previous ED stay documentation. Patient signed out to me with CT abd/pelv pending.    CT ab/pelv with no acute findings. Non-obstructive nephrolithiasis seen. No GB abnormalities. Gastric diverticulum noted. Cardiac markers negative. LFTs normal.    Patient resting comfortably on reassessment. No N/V in the ED. Okay for outpatient follow-up with GI. Refill for Protonix provided and additionally provided RX for Zofran and Carafate. GERD lifestyle changes discussed. Patient expresses understanding and agreeable to the plan. Return to ED precautions given for new, worsening, or concerning symptoms.    ED Diagnosis:  Final diagnoses:  [R10.13] Epigastric pain (Primary)  [R11.2] Nausea and vomiting, unspecified vomiting type    ED Disposition:   ED Disposition Condition    Discharge Stable

## 2025-04-14 NOTE — ED PROVIDER NOTES
Encounter Date: 4/14/2025       History     Chief Complaint   Patient presents with    Abdominal Pain     Epigastric pain and goes to back,    Nausea     The patient is a 44-year-old male. He has a documented past medical history of depression, HTN, previous stroke?, tobacco use, chronic right shoulder pain, chronic back pain, GERD, and gastric diverticulum. He presents to the ER for an emergent evaluation c/o acute epigastric pain x 2-3 days. He states that the pain seems to radiate through to his back. He reports intermittent episodes of nausea and vomiting. He denies any bloody or coffee ground emesis. He states that he is having mild intermittent episodes of lightheadedness. He denies any chest pain or SOB. He states that he did have 3-4 alcoholic drinks on the night before symptoms onset. He states that he does take NSAIDs occasionally for his shoulder and back. He does have frequent heart burn. Degree of pain described as moderate. Pain course reportedly intermittent. No mitigating or exacerbating factors reported. No pre-arrival treatment attempted.            Review of patient's allergies indicates:  No Known Allergies  Past Medical History:   Diagnosis Date    Chronic back pain     Chronic right shoulder pain     Depression     Gastric diverticulum     GERD (gastroesophageal reflux disease)     Hypertension     Rectal bleeding     Stroke 2023    Tobacco use      History reviewed. No pertinent surgical history.  Family History   Problem Relation Name Age of Onset    No Known Problems Mother      Glaucoma Father      Cancer Paternal Aunt      Diabetes Maternal Grandmother       Social History[1]  Review of Systems   Constitutional:  Negative for activity change, appetite change, chills, diaphoresis and fever.   HENT:  Negative for congestion and sore throat.    Eyes:  Negative for visual disturbance.   Respiratory:  Negative for cough, chest tightness, shortness of breath and wheezing.    Cardiovascular:   Negative for chest pain, palpitations and leg swelling.   Gastrointestinal:  Positive for abdominal pain, nausea and vomiting. Negative for abdominal distention, blood in stool, constipation and diarrhea.        (+) Epigastric abdominal pain radiating through to back    Genitourinary:  Negative for difficulty urinating, dysuria, flank pain, frequency and hematuria.   Musculoskeletal:  Positive for back pain. Negative for neck pain.   Skin:  Negative for color change and rash.   Allergic/Immunologic: Negative for immunocompromised state.   Neurological:  Positive for light-headedness. Negative for dizziness, seizures, syncope, speech difficulty, weakness, numbness and headaches.   Hematological:  Negative for adenopathy.   Psychiatric/Behavioral:  Negative for confusion.        Physical Exam     Initial Vitals [04/14/25 1323]   BP Pulse Resp Temp SpO2   (!) 128/93 74 18 98.6 °F (37 °C) 95 %      MAP       --         Physical Exam    Nursing note and vitals reviewed.  Constitutional: He appears well-developed and well-nourished. He is not diaphoretic.   HENT:   Head: Normocephalic.   Eyes: Conjunctivae are normal. No scleral icterus.   Neck: Neck supple. No JVD present.   Cardiovascular:  Normal rate and intact distal pulses.           Pulmonary/Chest: No respiratory distress. He has no wheezes.   Abdominal: Abdomen is soft. He exhibits no distension.   Epigastric tenderness - palpation reproduces pain. Negative Smith's sign. No pain to palpation at McBurney's point.  There is no rebound and no guarding.   Musculoskeletal:         General: No tenderness or edema. Normal range of motion.      Cervical back: Neck supple.     Neurological: He is alert and oriented to person, place, and time. He has normal strength.   Skin: Skin is warm and dry. No rash noted. No erythema.   Psychiatric: He has a normal mood and affect. His behavior is normal.         ED Course   Procedures  Labs Reviewed   COMPREHENSIVE METABOLIC PANEL  - Abnormal       Result Value    Sodium 142      Potassium 4.4      Chloride 105      CO2 27      Glucose 101      BUN 13      Creatinine 1.2      Calcium 9.8      Protein Total 7.7      Albumin 4.2      Bilirubin Total 0.7            AST 20      ALT 50 (*)     Anion Gap 10      eGFR >60     URINALYSIS, REFLEX TO URINE CULTURE - Abnormal    Color, UA Yellow      Appearance, UA Clear      pH, UA 6.0      Spec Grav UA 1.025      Protein, UA Trace (*)     Glucose, UA Negative      Ketones, UA Negative      Bilirubin, UA Negative      Blood, UA Negative      Nitrites, UA Negative      Urobilinogen, UA 2.0-3.0 (*)     Leukocyte Esterase, UA Negative     HEPATITIS C ANTIBODY - Normal    Hep C Ab Interp Non-Reactive     HIV 1 / 2 ANTIBODY - Normal    HIV 1/2 Ag/Ab Non-Reactive     LIPASE - Normal    Lipase Level 21     CBC WITH DIFFERENTIAL - Normal    WBC 8.68      RBC 5.30      HGB 15.6      HCT 48.8      MCV 92      MCH 29.4      MCHC 32.0      RDW 13.3      Platelet Count 219      MPV 11.0      Nucleated RBC 0      Neut % 61.5      Lymph % 30.2      Mono % 6.6      Eos % 1.2      Basophil % 0.2      Imm Grans % 0.3      Neut # 5.34      Lymph # 2.62      Mono # 0.57      Eos # 0.10      Baso # 0.02      Imm Grans # 0.03     TROPONIN I HIGH SENSITIVITY - Normal    Troponin High Sensitive <3     B-TYPE NATRIURETIC PEPTIDE - Normal    BNP <10     CBC W/ AUTO DIFFERENTIAL    Narrative:     The following orders were created for panel order CBC W/ AUTO DIFFERENTIAL.  Procedure                               Abnormality         Status                     ---------                               -----------         ------                     CBC with Differential[[id number]]       Normal              Final result                 Please view results for these tests on the individual orders.   GREY TOP URINE HOLD   ISTAT PROCEDURE    POC Glucose 105      POC BUN 13      POC Creatinine 1.4      POC Sodium 141      POC  Potassium 4.3      POC Chloride 103      POC TCO2 (MEASURED) 28      POC Ionized Calcium 1.11      POC Hematocrit 49      Sample KEVIN       EKG Readings: (Independently Interpreted)   Initial Reading: No STEMI. Rhythm: Normal Sinus Rhythm. Heart Rate: 63. ST Segments: Normal ST Segments. T Waves: Normal.   ER attending physician reviewed and signed - no acute ischemic changes identified      ECG Results              EKG 12-lead (Final result)        Collection Time Result Time QRS Duration OHS QTC Calculation    04/14/25 13:29:29 04/14/25 13:39:06 96 376                     Final result by Interface, Lab In [facility] (04/14/25 13:39:14)                   Narrative:    Test Reason : R10.13,    Vent. Rate :  63 BPM     Atrial Rate :  63 BPM     P-R Int : 160 ms          QRS Dur :  96 ms      QT Int : 368 ms       P-R-T Axes :  46  42  52 degrees    QTcB Int : 376 ms    Normal sinus rhythm  Early repolarization  Minimal voltage criteria for LVH, may be normal variant ( Sokolow-Kinsey )    Abnormal ECG  No previous ECGs available  Confirmed by Demarco Staton ([id number]) on 4/14/2025 1:39:02 PM    Referred By:            Confirmed By: Demarco Staton                                  Imaging Results              CT Abdomen Pelvis With IV Contrast NO Oral Contrast (Final result)  Result time 04/14/25 17:27:14      Final result by Abhishek Higgins MD (04/14/25 17:27:14)                   Impression:      No acute process in the abdomen or pelvis to account for patient's reported symptoms.    Bilateral nonobstructing renal calculi.  No hydronephrosis.    Additional findings as above.    Electronically signed by resident: Rock Babin  Date:    04/14/2025  Time:    17:06    Electronically signed by: Abhishek Higgins MD  Date:    04/14/2025  Time:    17:27               Narrative:    EXAMINATION:  CT ABDOMEN PELVIS WITH IV CONTRAST    CLINICAL HISTORY:  Epigastric pain;    TECHNIQUE:  Axial images of the abdomen and pelvis were acquired after  the use of 75 cc [id number] IV contrast. No oral contrast was administered.  Coronal and sagittal reconstructions were also obtained    COMPARISON:  CT renal stone study 10/08/2024.    FINDINGS:  Heart: Normal in size. No pericardial effusion. No significant calcific coronary atherosclerosis.    Lung Bases: Lung bases are well aerated, without consolidation, pneumothorax, or pleural fluid.    Liver: Liver is normal size.  Normal contour.  No focal hepatic lesion.    Gallbladder: No calcified gallstones. No pericholecystic fluid or gallbladder wall thickening.    Bile Ducts: No intrahepatic or extrahepatic biliary ductal dilatation.    Pancreas: No mass, ductal dilation, or peripancreatic fat stranding.    Spleen: Unremarkable.    Adrenals: Unremarkable.    Kidneys/Ureters: Normal in size and location. Kidneys enhance symmetrically.  1.1 cm simple cyst on the right.  Subcentimeter hypodensity on the left, too small to characterize.  Bilateral nonobstructing renal calculi measuring up to 4 mm on the right and 3 mm on the left.  No hydronephrosis.  No ureteral dilatation.    Bladder: Bladder is under distended and incompletely evaluated.    Reproductive organs: Unremarkable.    Peritoneum/retroperitoneum: No free air or free fluid.    Lymph Nodes: No pathologically enlarged lymph nodes.    GI tract/Mesentery: Similar outpouching containing air and fluid posterior to the gastric fundus, likely gastric diverticulum. Bowel is normal in caliber without evidence of obstruction or inflammation.  Appendix is unremarkable.    Abdominal wall/Soft Tissues: Unremarkable.    Vasculature: Abdominal aorta is normal in caliber without significant calcific atherosclerosis.    Bones: No acute fracture. No suspicious osseous lesions.                                       Medications   ondansetron injection 4 mg (4 mg Intravenous Given 4/14/25 1453)   sodium chloride 0.9% bolus 1,000 mL 1,000 mL (0 mLs Intravenous Stopped 4/14/25 1546)    LIDOcaine viscous HCl 2% oral solution 5 mL (5 mLs Oral Given 4/14/25 1452)   famotidine tablet 20 mg (20 mg Oral Given 4/14/25 1453)   aluminum-magnesium hydroxide-simethicone 200-200-20 mg/5 mL suspension 15 mL (15 mLs Oral Given 4/14/25 1452)   iohexoL (OMNIPAQUE 350) injection 75 mL (75 mLs Intravenous Given 4/14/25 1641)     Medical Decision Making  Amount and/or Complexity of Data Reviewed  Labs: ordered.  Radiology: ordered.    Risk  OTC drugs.  Prescription drug management.      Additional MDM:     EKG: I have independently interpreted EKG(s) - see notes., EKG - Independent Interpretation - Normal sinus rhythm, normal rate, no acute changes.                      Medical Decision Making:   History:   Old Medical Records: I decided to obtain old medical records.  Initial Assessment:   45 yo M, hx of GERD and gastric diverticulum presents to the ER c/o acute epigastric abdominal pain that radiates to back with associated nausea, vomiting and lightheadedness x 2-3 days. Reports alcohol consumption night before onset. Reports frequent NSAID use for joint pains. Denies any hematemesis, no black bloody stools.   Differential Diagnosis:   GERD, Dyspepsia, Gastric ulcer, H pylori, pancreatitis, duodenitis, gallbladder disease, hiatal hernia, gastric diverticulum, ACS, etc   I considered but have low suspicion for PE, aortic dissection based on history and exam     Clinical Tests:   Lab Tests: Ordered and Reviewed  Radiological Study: Ordered and Reviewed  Medical Tests: Ordered and Reviewed  ED Management:  Vital signs reviewed - benign   Chart review completed - previous records/abdominal imaging reviewed   Pt given IV fluids, IV Zofran, PO Maalox/Lidocaine   EKG completed - NSR, normal rate, no acute ischemic changes   Labs completed/reviewed - normal CBC, CMP unremarkable aside from slightly elevated ALT of 50, negative lipase, negative UA, negative HS troponin and BNP   CT abdomen/pelvis with IV contrast  ordered/pending   Case discussed with the ER attending physician   I gave report and signed out patient to oncoming provider who will assume all further care and management of this patient due to pending work up/disposition at end of my shift     Other:   I have discussed this case with another health care provider.             Clinical Impression:  Final diagnoses:  [R10.13] Epigastric pain (Primary)  [R11.2] Nausea and vomiting, unspecified vomiting type          ED Disposition Condition    Discharge Stable          ED Prescriptions       Medication Sig Dispense Start Date End Date Auth. Provider    sucralfate (CARAFATE) 100 mg/mL suspension Take 10 mLs (1 g total) by mouth 4 (four) times daily. 414 mL 4/14/2025 -- Ken Monk PA-C    ondansetron (ZOFRAN-ODT) 4 MG TbDL Take 1 tablet (4 mg total) by mouth every 6 (six) hours as needed. 15 tablet 4/14/2025 -- Ken Monk PA-C    pantoprazole (PROTONIX) 40 MG tablet Take 1 tablet (40 mg total) by mouth once daily. 30 tablet 4/14/2025 5/14/2025 Ken Monk PA-C          Follow-up Information       Follow up With Specialties Details Why Contact Info Additional Information    [facility] [address] [facility]   [address]  [facility] [id number] [phone] [facility] - Main Building, 4th Floor Please park in [facility] and take [facility] elevator               [1]   Social History  Tobacco Use    Smoking status: Every Day     Current packs/day: 1.00     Average packs/day: 1 pack/day for 10.8 years (10.8 ttl pk-yrs)     Types: Cigarettes     Start date: 7/16/2014     Passive exposure: Current    Smokeless tobacco: Former   Substance Use Topics    Alcohol use: Yes     Comment: socially    Drug use: Never        Edd Goodwin PA-C  04/16/25 1525

## 2025-04-14 NOTE — FIRST PROVIDER EVALUATION
"Medical screening examination initiated.  I have conducted a focused provider triage encounter, findings are as follows:    Brief history of present illness:  44-year-old male no significant medical history presenting with pain in his epigastric radiating to the back associated with feeling wooziness and dizziness. Associated n/v, pain is located in the epigastrum worse at night. Waves of nausea. Pain began Saturday associated with sharp pain radiating to back - pain 8/10.     Vitals:    04/14/25 1323   BP: (!) 128/93   Pulse: 74   Resp: 18   Temp: 98.6 °F (37 °C)   TempSrc: Oral   SpO2: 95%   Weight: 88 kg (194 lb)   Height: 6' 1.5" (1.867 m)       Pertinent physical exam:  ambulatory, non-toxic, no deficits    Brief workup plan:  abd pain workup    Preliminary workup initiated; this workup will be continued and followed by the physician or advanced practice provider that is assigned to the patient when roomed.    Can Moyer DO, FAAEM  Emergency Staff Physician   Dept of Emergency Medicine   [facility]  Spectralink: [id number]        Disclaimer: This note has been generated using voice-recognition software. There may be typographical errors that have been missed during proof-reading.    "

## 2025-04-22 ENCOUNTER — TELEPHONE (OUTPATIENT)
Dept: ENDOSCOPY | Facility: HOSPITAL | Age: 45
End: 2025-04-22
Payer: COMMERCIAL

## 2025-04-22 ENCOUNTER — OFFICE VISIT (OUTPATIENT)
Dept: GASTROENTEROLOGY | Facility: CLINIC | Age: 45
End: 2025-04-22
Payer: COMMERCIAL

## 2025-04-22 VITALS
BODY MASS INDEX: 24.45 KG/M2 | RESPIRATION RATE: 18 BRPM | WEIGHT: 190.5 LBS | SYSTOLIC BLOOD PRESSURE: 130 MMHG | DIASTOLIC BLOOD PRESSURE: 89 MMHG | HEIGHT: 74 IN | HEIGHT: 74 IN | HEART RATE: 84 BPM | BODY MASS INDEX: 24.38 KG/M2 | WEIGHT: 190 LBS

## 2025-04-22 DIAGNOSIS — K92.1 BLOOD IN STOOL: ICD-10-CM

## 2025-04-22 DIAGNOSIS — R10.13 EPIGASTRIC PAIN: Primary | ICD-10-CM

## 2025-04-22 DIAGNOSIS — R68.81 EARLY SATIETY: ICD-10-CM

## 2025-04-22 DIAGNOSIS — R11.10 VOMITING, UNSPECIFIED VOMITING TYPE, UNSPECIFIED WHETHER NAUSEA PRESENT: ICD-10-CM

## 2025-04-22 DIAGNOSIS — R11.2 NAUSEA AND VOMITING, UNSPECIFIED VOMITING TYPE: ICD-10-CM

## 2025-04-22 DIAGNOSIS — K21.9 GASTROESOPHAGEAL REFLUX DISEASE, UNSPECIFIED WHETHER ESOPHAGITIS PRESENT: Primary | ICD-10-CM

## 2025-04-22 PROCEDURE — 3044F HG A1C LEVEL LT 7.0%: CPT | Mod: CPTII,S$GLB,,

## 2025-04-22 PROCEDURE — 99999 PR PBB SHADOW E&M-EST. PATIENT-LVL V: CPT | Mod: PBBFAC,,,

## 2025-04-22 PROCEDURE — 99204 OFFICE O/P NEW MOD 45 MIN: CPT | Mod: S$GLB,,,

## 2025-04-22 PROCEDURE — 1160F RVW MEDS BY RX/DR IN RCRD: CPT | Mod: CPTII,S$GLB,,

## 2025-04-22 PROCEDURE — 3075F SYST BP GE 130 - 139MM HG: CPT | Mod: CPTII,S$GLB,,

## 2025-04-22 PROCEDURE — 3008F BODY MASS INDEX DOCD: CPT | Mod: CPTII,S$GLB,,

## 2025-04-22 PROCEDURE — 1159F MED LIST DOCD IN RCRD: CPT | Mod: CPTII,S$GLB,,

## 2025-04-22 PROCEDURE — 3079F DIAST BP 80-89 MM HG: CPT | Mod: CPTII,S$GLB,,

## 2025-04-22 NOTE — PROGRESS NOTES
Gastroenterology Clinic Consultation Note    Reason for Visit:  The primary encounter diagnosis was Epigastric pain. Diagnoses of Nausea and vomiting, unspecified vomiting type, Blood in stool, and Early satiety were also pertinent to this visit.    PCP:   Niels Guy   [address] [facility] [address]      Initial HPI   This is a 44 y.o. male presenting for epigastric abdominal pain that radiated to his back two weeks ago. Reports this has happened maybe about three times in the past year and a half. States he woke up with epigastric pain and vomiting one morning. He went to the ER on 04/14/2025. CT scan performed noted gastric diverticulum but no acute process in the abdomen or pelvis to account for symptoms. He was discharged with Pantoprazole, Carafate and Zofran and referred to GI. Today he states he is no longer in pain. He has been taking the Pantoprazole with relief. He has taken the Carafate once and has not had to take any Zofran. He has not vomited since his ER visit. He does reports a hx of GERD. States he has burning in his chest that moves up. He denies dysphagia or odynophagia but reports early satiety. States he is feeling full faster. This started about 2 years ago and believed it was due to depression. He is no longer derepressed but still feels like he becomes full faster. He works at a busy restaurant and sometimes wont eat until he gets home. He then eats turkey sandwiches. He does take Meloxicam every other night, drinks caffeine and red bull. He denies any hematemesis or dark tarry stools. He drinks socially and smokes cigarettes. States his stools are mostly normal for the week but then he has either constipation or diarrhea once weekly. He has noticed bright red blood on the toilet and tissue after being constipated. He is unsure if he has hemorrhoids. He does not have a family history of any GI  cancers.     Abdominal Pain  How Lon weeks ago- has happened before   Frequency: Pain is gone now   Location: Epigastric pain   Quality: Feels like a Protruding punch   Radiate: Back- sharp   Aggravated or Improved with bowel movement  /eating/ movement Improved a little with vomiting- laying down  Aggravated- bending down   Medications tried:  Pantoprazole, Carafate and Zofran   Nausea/Vomiting/Unexplained Weight Loss: Nausea and vomiting lost 4 pounds   Pyrosis/Reflux/Dysphagia: Burning in chest   Bowel Movements: Normal- sometime constipation and diarrhea   Melena/Hematochezia: Bright red blood    Symptoms: no   GLP-1s:  no   NSAIDs:  Meloxicam- every other night   Anticoagulation or Antiplatelet: no   History of H.pylori: no   Prior Colonoscopy: no   Prior Upper Endoscopy: no   Family h/o Crohn's  no   Ulcerative Colitis: no   Family h/o Celiac Sprue: no   Family h/o Colon Cancer:     no   Abdominal Surgeries: no       ROS:  Review of Systems   Constitutional:  Negative for chills and fever.   Respiratory:  Negative for cough and shortness of breath.    Cardiovascular:  Negative for chest pain.   Gastrointestinal:  Positive for abdominal pain, blood in stool, heartburn and vomiting. Negative for constipation, diarrhea, melena and nausea.   Skin:  Negative for rash.   Neurological:  Negative for seizures, loss of consciousness and weakness.        Medical History:  has a past medical history of Chronic back pain, Chronic right shoulder pain, Depression, Gastric diverticulum, GERD (gastroesophageal reflux disease), Hypertension, Rectal bleeding, Stroke (), and Tobacco use.    Surgical History:  has no past surgical history on file.    Family History: family history includes Cancer in his paternal aunt; Diabetes in his maternal grandmother; Glaucoma in his father; No Known Problems in his mother..       Review of patient's allergies indicates:  No Known Allergies    Medications Ordered Prior to  "Encounter[1]      Objective Findings:    Vital Signs:  /89 (BP Location: Left arm, Patient Position: Sitting)   Pulse 84   Resp 18   Ht 6' 1.5" (1.867 m)   Wt 86.4 kg (190 lb 7.6 oz)   BMI 24.79 kg/m²   Body mass index is 24.79 kg/m².    Physical Exam:  Physical Exam  Vitals and nursing note reviewed.   Constitutional:       General: He is not in acute distress.     Appearance: Normal appearance. He is not ill-appearing.   HENT:      Head: Normocephalic and atraumatic.   Eyes:      Extraocular Movements: Extraocular movements intact.   Cardiovascular:      Rate and Rhythm: Normal rate and regular rhythm.   Pulmonary:      Effort: Pulmonary effort is normal. No respiratory distress.   Abdominal:      General: Abdomen is flat. Bowel sounds are normal. There is no distension.      Palpations: Abdomen is soft.      Tenderness: There is no abdominal tenderness.   Neurological:      General: No focal deficit present.      Mental Status: He is alert and oriented to person, place, and time.   Psychiatric:         Mood and Affect: Mood normal.         Behavior: Behavior normal.             Labs:  Lab Results   Component Value Date    WBC 8.68 04/14/2025    HGB 15.6 04/14/2025    HCT 49 04/14/2025     04/14/2025    CHOL 148 05/29/2007    TRIG 63 05/29/2007    HDL 42 05/29/2007    ALKPHOS 141 04/14/2025    LIPASE 21 04/14/2025    ALT 50 (H) 04/14/2025    AST 20 04/14/2025     04/14/2025    K 4.4 04/14/2025     04/14/2025    CREATININE 1.2 04/14/2025    BUN 13 04/14/2025    CO2 27 04/14/2025    TSH 0.72 05/29/2007    HGBA1C 5.9 (H) 01/24/2025       Imaging reviewed: CT Abdomen Pelvis 04/14/2025  FINDINGS:  Heart: Normal in size. No pericardial effusion. No significant calcific coronary atherosclerosis.     Lung Bases: Lung bases are well aerated, without consolidation, pneumothorax, or pleural fluid.     Liver: Liver is normal size.  Normal contour.  No focal hepatic lesion.     Gallbladder: No " calcified gallstones. No pericholecystic fluid or gallbladder wall thickening.     Bile Ducts: No intrahepatic or extrahepatic biliary ductal dilatation.     Pancreas: No mass, ductal dilation, or peripancreatic fat stranding.     Spleen: Unremarkable.     Adrenals: Unremarkable.     Kidneys/Ureters: Normal in size and location. Kidneys enhance symmetrically.  1.1 cm simple cyst on the right.  Subcentimeter hypodensity on the left, too small to characterize.  Bilateral nonobstructing renal calculi measuring up to 4 mm on the right and 3 mm on the left.  No hydronephrosis.  No ureteral dilatation.     Bladder: Bladder is under distended and incompletely evaluated.     Reproductive organs: Unremarkable.     Peritoneum/retroperitoneum: No free air or free fluid.     Lymph Nodes: No pathologically enlarged lymph nodes.     GI tract/Mesentery: Similar outpouching containing air and fluid posterior to the gastric fundus, likely gastric diverticulum. Bowel is normal in caliber without evidence of obstruction or inflammation.  Appendix is unremarkable.     Abdominal wall/Soft Tissues: Unremarkable.     Vasculature: Abdominal aorta is normal in caliber without significant calcific atherosclerosis.     Bones: No acute fracture. No suspicious osseous lesions.     Impression:     No acute process in the abdomen or pelvis to account for patient's reported symptoms.     Bilateral nonobstructing renal calculi.  No hydronephrosis.     Additional findings as above.     Electronically signed by resident: Rock Babin  Date:                                            04/14/2025  Time:                                           17:06     Electronically signed by:Abhishek Higgins MD  Date:                                            04/14/2025  Time:                                           17:27    Endoscopy reviewed: No previous endoscopies      Assessment:  1. Epigastric pain    2. Nausea and vomiting, unspecified vomiting type    3.  Blood in stool    4. Early satiety      Orders Placed This Encounter    Ambulatory referral/consult to Colorectal Surgery         Plan:  Resolved. Continue taking Pantoprazole 40 mg daily 30-45 minutes before first meal of the day for 8 weeks. He has this medication already.   Resolved. Has Zofran PRN.   Referral to CRS to evaluate.  Referral for EGD to evaluate.       Thank you for allowing me to participate in this patient's care.    Sincerely,    PINO ADAMS-ELISABETH  Gastroenterology Department  [facility]  [phone]           [1]   Current Outpatient Medications on File Prior to Visit   Medication Sig Dispense Refill    amLODIPine (NORVASC) 5 MG tablet Take 1 tablet (5 mg total) by mouth once daily. 30 tablet 2    cyclobenzaprine (FLEXERIL) 5 MG tablet One tablet t.i.d. p.r.n. for back pain and muscle spasm 30 tablet 0    dextroamphetamine-amphetamine (ADDERALL XR) 10 MG 24 hr capsule Take 1 capsule (10 mg total) by mouth every morning. 30 capsule 0    diclofenac sodium (VOLTAREN ARTHRITIS PAIN) 1 % Gel Apply 2 g topically 4 (four) times daily. 450 g 1    ibuprofen (ADVIL,MOTRIN) 600 MG tablet Take 1 tablet (600 mg total) by mouth 3 (three) times daily as needed for Pain. 30 tablet 0    ondansetron (ZOFRAN-ODT) 4 MG TbDL Take 1 tablet (4 mg total) by mouth every 6 (six) hours as needed. 15 tablet 0    pantoprazole (PROTONIX) 40 MG tablet Take 1 tablet (40 mg total) by mouth once daily. 30 tablet 0    promethazine (PHENERGAN) 12.5 MG Tab Take 1 tablet (12.5 mg total) by mouth daily as needed. 20 tablet 0    sucralfate (CARAFATE) 100 mg/mL suspension Take 10 mLs (1 g total) by mouth 4 (four) times daily. 414 mL 0     No current facility-administered medications on file prior to visit.

## 2025-04-22 NOTE — TELEPHONE ENCOUNTER
Referral for procedure from [facility]      Spoke to Eben to schedule procedure(s) Upper Endoscopy (EGD)       Physician to perform procedure(s) Dr. MADDIE Vidal  Date of Procedure (s) 5/5/25  Arrival Time 9:45 AM  Time of Procedure(s) 10:45 AM   Location of Procedure(s) [address]  Type of Rx Prep sent to patient: Other  Instructions provided to patient via Copy in hand    Patient was informed on the following information and verbalized understanding. Screening questionnaire reviewed with patient and complete. If procedure requires anesthesia, a responsible adult needs to be present to accompany the patient home, patient cannot drive after receiving anesthesia. Appointment details are tentative, especially check-in time. Patient will receive a prep-op call 7 days prior to confirm check-in time for procedure. If applicable the patient should contact their pharmacy to verify Rx for procedure prep is ready for pick-up. Patient was advised to call the scheduling department at [phone] if pharmacy states no Rx is available. Patient was advised to call the endoscopy scheduling department if any questions or concerns arise.       Endoscopy Scheduling Department

## 2025-04-22 NOTE — TELEPHONE ENCOUNTER
"----- Message from SANDRO Turner sent at 4/22/2025  1:26 PM CDT -----  Regarding: EGD  Procedure: EGDDiagnosis: early satiety, GERD, vomitingProcedure Timing: Within 12 weeks#If within 4 weeks selected, please marco antonio as high priority##If greater than 12 weeks, please select "5-12 weeks" and delay sending until 3 months prior to requested date# Location: Any SiteAdditional Scheduling Information: No scheduling concernsPrep Specifications:N/AIs the patient taking a GLP-1 Agonist:noHave you attached a patient to this message: yes  "

## 2025-05-06 ENCOUNTER — OFFICE VISIT (OUTPATIENT)
Dept: PSYCHIATRY | Facility: CLINIC | Age: 45
End: 2025-05-06
Payer: COMMERCIAL

## 2025-05-06 DIAGNOSIS — F33.1 MODERATE EPISODE OF RECURRENT MAJOR DEPRESSIVE DISORDER: Primary | ICD-10-CM

## 2025-05-06 PROCEDURE — 90837 PSYTX W PT 60 MINUTES: CPT | Mod: S$GLB,,, | Performed by: CASE MANAGER/CARE COORDINATOR

## 2025-05-06 PROCEDURE — 3044F HG A1C LEVEL LT 7.0%: CPT | Mod: CPTII,S$GLB,, | Performed by: CASE MANAGER/CARE COORDINATOR

## 2025-05-06 NOTE — PROGRESS NOTES
"Individual Psychotherapy (PhD/LCSW)    5/6/2025    Site:  [facility]         Therapeutic Intervention: Met with patient and mother.  Outpatient - Insight oriented psychotherapy 60 min - CPT code 46926, Outpatient - Behavior modifying psychotherapy 60 min - CPT code 12483, Outpatient - Supportive psychotherapy 60 min - CPT Code 83537, and Outpatient - Interactive psychotherapy 60 min - CPT code 62913    Chief complaint/reason for encounter: depression, anxiety, and interpersonal     Interval history and content of current session: Pt presents for f/u session. Pt says he is "doing well."  Home life is "good" and he and his girlfriend have been communicating better.  She "understands my triggers" and avoids them in conversations while still communicating effectively.  Discussed his concerns about impulse spending and ways to manage.  Spending is affecting his ability to save but not pay bills.  He has "always spent on others" and is not used to spend on himself.  Discussed difficult relationship with work colleague.      Treatment plan:  Target symptoms: depression, distractability, lack of focus, anxiety , work stress  Why chosen therapy is appropriate versus another modality: relevant to diagnosis  Outcome monitoring methods: self-report, observation  Therapeutic intervention type: insight oriented psychotherapy, behavior modifying psychotherapy, supportive psychotherapy, interactive psychotherapy    Risk parameters:  Patient reports no suicidal ideation  Patient reports no homicidal ideation  Patient reports no self-injurious behavior  Patient reports no violent behavior    Verbal deficits: None    Patient's response to intervention:  The patient's response to intervention is accepting.    Progress toward goals and other mental status changes:  The patient's progress toward goals is good.    Diagnosis:     ICD-10-CM ICD-9-CM   1. Moderate episode of recurrent major depressive disorder  F33.1 296.32 "       Plan:  individual psychotherapy    Return to clinic: as scheduled    Length of Service (minutes): 60

## 2025-05-16 ENCOUNTER — TELEPHONE (OUTPATIENT)
Dept: ENDOSCOPY | Facility: HOSPITAL | Age: 45
End: 2025-05-16
Payer: COMMERCIAL

## 2025-05-16 NOTE — TELEPHONE ENCOUNTER
Contacted the patient to schedule an endoscopy procedure(s) Upper Endoscopy (EGD) . The patient did not answer the call and left a voice message requesting a call back.

## 2025-05-26 ENCOUNTER — OFFICE VISIT (OUTPATIENT)
Dept: PSYCHIATRY | Facility: CLINIC | Age: 45
End: 2025-05-26
Payer: COMMERCIAL

## 2025-05-26 VITALS
SYSTOLIC BLOOD PRESSURE: 132 MMHG | BODY MASS INDEX: 24.49 KG/M2 | HEART RATE: 85 BPM | WEIGHT: 188.19 LBS | DIASTOLIC BLOOD PRESSURE: 91 MMHG

## 2025-05-26 DIAGNOSIS — F33.1 MODERATE EPISODE OF RECURRENT MAJOR DEPRESSIVE DISORDER: ICD-10-CM

## 2025-05-26 DIAGNOSIS — F90.2 ATTENTION DEFICIT HYPERACTIVITY DISORDER (ADHD), COMBINED TYPE: Primary | ICD-10-CM

## 2025-05-26 PROCEDURE — G2211 COMPLEX E/M VISIT ADD ON: HCPCS | Mod: S$GLB,,,

## 2025-05-26 PROCEDURE — 3080F DIAST BP >= 90 MM HG: CPT | Mod: CPTII,S$GLB,,

## 2025-05-26 PROCEDURE — 99999 PR PBB SHADOW E&M-EST. PATIENT-LVL II: CPT | Mod: PBBFAC,,,

## 2025-05-26 PROCEDURE — 3044F HG A1C LEVEL LT 7.0%: CPT | Mod: CPTII,S$GLB,,

## 2025-05-26 PROCEDURE — 3075F SYST BP GE 130 - 139MM HG: CPT | Mod: CPTII,S$GLB,,

## 2025-05-26 PROCEDURE — 3008F BODY MASS INDEX DOCD: CPT | Mod: CPTII,S$GLB,,

## 2025-05-26 PROCEDURE — 99214 OFFICE O/P EST MOD 30 MIN: CPT | Mod: S$GLB,,,

## 2025-05-26 RX ORDER — DEXTROAMPHETAMINE SACCHARATE, AMPHETAMINE ASPARTATE MONOHYDRATE, DEXTROAMPHETAMINE SULFATE AND AMPHETAMINE SULFATE 5; 5; 5; 5 MG/1; MG/1; MG/1; MG/1
20 CAPSULE, EXTENDED RELEASE ORAL EVERY MORNING
Qty: 30 CAPSULE | Refills: 0 | Status: SHIPPED | OUTPATIENT
Start: 2025-05-26

## 2025-05-26 NOTE — PROGRESS NOTES
Outpatient Psychiatry Follow-Up Visit (MD/NP)    5/26/2025    Clinical Status of Patient:  Outpatient (Ambulatory)    Chief Complaint:  Eben Lagunas is a 44 y.o. male who presents today for follow-up of mood disorder.  Met with patient.      Interval History and Content of Current Session:  Interim Events/Subjective Report/Content of Current Session:     Patient presents today for follow up. Does not notice induction of ADHD medication, will trial an increase. At this time he is doing well, work going well, denies any ah/vh/si/hi. Discussed with patient that with respect to his overall health and past medical history if he does not receive any benefit from stimulant medication may wean and d/c it. He verbalizes his understanding to this. Goals continue to be complete work/school assignments on time, stay on task without frequent reminders, less frequent interruptions of others, remain focused during conversations with others as evidenced by self-report and observation.        CURRENT PSYCHOTROPIC REGIMEN:  Wellbutrin, self discontinued due to lack of efficacy, states it was only slightly helpful     Discussed that any stimulant use will remain at a low dose due to patient's cardiac and neurological history. He understands, will trial medication and titrate carefully. Patient is advised to discontinue medication immediately on experiencing adverse effects.           Treatment Plan/Recommendations:   Medication Management: Continue current medications. The risks and benefits of medication were discussed with the patient.  The treatment plan and follow up plan were reviewed with the patient.  Medication use will be careful and sparing with low dose to achieve maximum benefit for minimum side effects. Discussed with patient potential neurological and cardiac issues that stimulant medication may exacerbate, he is amenable to trialing at a very low dose.         Psychotherapy:  Target symptoms: depression,  anxiety   Why chosen therapy is appropriate versus another modality: relevant to diagnosis  Outcome monitoring methods: self-report, observation  Therapeutic intervention type: insight oriented psychotherapy  Topics discussed/themes: building skills sets for symptom management, symptom recognition  The patient's response to the intervention is accepting. The patient's progress toward treatment goals is good.   Duration of intervention: 05 minutes.    Review of Systems   PSYCHIATRIC: Pertinant items are noted in the narrative.    Past Medical, Family and Social History: The patient's past medical, family and social history have been reviewed and updated as appropriate within the electronic medical record - see encounter notes.    Compliance: yes    Side effects: None    Risk Parameters:  Patient reports no suicidal ideation  Patient reports no homicidal ideation  Patient reports no self-injurious behavior  Patient reports no violent behavior    Exam (detailed: at least 9 elements; comprehensive: all 15 elements)   Constitutional  Vitals:  Most recent vital signs, dated less than 90 days prior to this appointment, were reviewed.   There were no vitals filed for this visit.     General:  unremarkable, age appropriate     Musculoskeletal  Muscle Strength/Tone:  no tremor, no tic   Gait & Station:  non-ataxic     Psychiatric  Speech:  no latency; no press   Mood & Affect:  steady, euthymic  congruent and appropriate   Thought Process:  normal and logical   Associations:  intact   Thought Content:  normal, no suicidality, no homicidality, delusions, or paranoia   Insight:  intact   Judgement: behavior is adequate to circumstances   Orientation:  grossly intact   Memory: intact for content of interview   Language: grossly intact   Attention Span & Concentration:  able to focus   Fund of Knowledge:  intact and appropriate to age and level of education     Assessment and Diagnosis   Status/Progress: Based on the examination  today, the patient's problem(s) is/are adequately but not ideally controlled.  New problems have not been presented today.   Co-morbidities are complicating management of the primary condition.  There are no active rule-out diagnoses for this patient at this time.     General Impression:   1. Attention deficit hyperactivity disorder (ADHD), combined type        2. Moderate episode of recurrent major depressive disorder  dextroamphetamine-amphetamine (ADDERALL XR) 20 MG 24 hr capsule            Intervention/Counseling/Treatment Plan   Medication Management: Continue current medications. The risks and benefits of medication were discussed with the patient.      Labs: reviewed most recent. The treatment plan and follow up plan were reviewed with the patient. Discussed with patient informed consent, risks vs. benefits, alternative treatments, side effect profile and the inherent unpredictability of individual responses to these treatments. The patient expresses understanding of the above and displays the capacity to agree with this current plan and had no other questions. Encouraged Patient to keep future appointments. Take medications as prescribed and abstain from substance abuse. In the event of an emergency patient was advised to go to the emergency room.    Return to Clinic: 1 month, 3 months, as scheduled, as needed

## 2025-07-22 ENCOUNTER — OFFICE VISIT (OUTPATIENT)
Dept: PSYCHIATRY | Facility: CLINIC | Age: 45
End: 2025-07-22
Payer: COMMERCIAL

## 2025-07-22 DIAGNOSIS — F33.1 MODERATE EPISODE OF RECURRENT MAJOR DEPRESSIVE DISORDER: Primary | ICD-10-CM

## 2025-07-22 PROCEDURE — 3044F HG A1C LEVEL LT 7.0%: CPT | Mod: CPTII,S$GLB,, | Performed by: CASE MANAGER/CARE COORDINATOR

## 2025-07-22 PROCEDURE — 90837 PSYTX W PT 60 MINUTES: CPT | Mod: S$GLB,,, | Performed by: CASE MANAGER/CARE COORDINATOR

## 2025-07-29 NOTE — PROGRESS NOTES
Individual Psychotherapy (PhD/LCSW)    7/22/2025    Site:  [facility]         Therapeutic Intervention: Met with patient and mother.  Outpatient - Insight oriented psychotherapy 60 min - CPT code 75720, Outpatient - Behavior modifying psychotherapy 60 min - CPT code 20478, Outpatient - Supportive psychotherapy 60 min - CPT Code 69544, and Outpatient - Interactive psychotherapy 60 min - CPT code 90632    Chief complaint/reason for encounter: depression, anxiety, and interpersonal     Interval history and content of current session: Pt presents for f/u session. Relationship with girlfriend, Lissy, is going well.  They started couples' counseling last week.  Communication has been better but they did have a fight on her birthday over unresolved issues. He bought a ring and is planning to propose.  Pt reports he conflict makes him feel anxious and he becomes passive.  Discussed assertive communication.      Treatment plan:  Target symptoms: depression, distractability, lack of focus, anxiety , work stress  Why chosen therapy is appropriate versus another modality: relevant to diagnosis  Outcome monitoring methods: self-report, observation  Therapeutic intervention type: insight oriented psychotherapy, behavior modifying psychotherapy, supportive psychotherapy, interactive psychotherapy    Risk parameters:  Patient reports no suicidal ideation  Patient reports no homicidal ideation  Patient reports no self-injurious behavior  Patient reports no violent behavior    Verbal deficits: None    Patient's response to intervention:  The patient's response to intervention is accepting.    Progress toward goals and other mental status changes:  The patient's progress toward goals is good.    Diagnosis:     ICD-10-CM ICD-9-CM   1. Moderate episode of recurrent major depressive disorder  F33.1 296.32       Plan:  individual psychotherapy    Return to clinic: as scheduled    Length of Service (minutes): 60

## 2025-08-07 ENCOUNTER — OFFICE VISIT (OUTPATIENT)
Dept: PSYCHIATRY | Facility: CLINIC | Age: 45
End: 2025-08-07
Payer: COMMERCIAL

## 2025-08-07 VITALS — DIASTOLIC BLOOD PRESSURE: 89 MMHG | SYSTOLIC BLOOD PRESSURE: 125 MMHG | HEART RATE: 74 BPM

## 2025-08-07 DIAGNOSIS — F33.0 MILD EPISODE OF RECURRENT MAJOR DEPRESSIVE DISORDER: Primary | ICD-10-CM

## 2025-08-07 PROCEDURE — 3074F SYST BP LT 130 MM HG: CPT | Mod: CPTII,S$GLB,, | Performed by: CASE MANAGER/CARE COORDINATOR

## 2025-08-07 PROCEDURE — 3044F HG A1C LEVEL LT 7.0%: CPT | Mod: CPTII,S$GLB,, | Performed by: CASE MANAGER/CARE COORDINATOR

## 2025-08-07 PROCEDURE — 90837 PSYTX W PT 60 MINUTES: CPT | Mod: S$GLB,,, | Performed by: CASE MANAGER/CARE COORDINATOR

## 2025-08-07 PROCEDURE — 3079F DIAST BP 80-89 MM HG: CPT | Mod: CPTII,S$GLB,, | Performed by: CASE MANAGER/CARE COORDINATOR

## 2025-08-07 PROCEDURE — 99999 PR PBB SHADOW E&M-EST. PATIENT-LVL I: CPT | Mod: PBBFAC,,, | Performed by: CASE MANAGER/CARE COORDINATOR

## 2025-08-25 PROBLEM — F33.0 MILD EPISODE OF RECURRENT MAJOR DEPRESSIVE DISORDER: Status: ACTIVE | Noted: 2024-10-24
